# Patient Record
Sex: MALE | Race: WHITE | NOT HISPANIC OR LATINO | Employment: OTHER | URBAN - METROPOLITAN AREA
[De-identification: names, ages, dates, MRNs, and addresses within clinical notes are randomized per-mention and may not be internally consistent; named-entity substitution may affect disease eponyms.]

---

## 2022-12-14 LAB — CRC RECOMMENDATION EXT: NORMAL

## 2024-08-13 ENCOUNTER — OFFICE VISIT (OUTPATIENT)
Dept: FAMILY MEDICINE | Facility: CLINIC | Age: 69
End: 2024-08-13
Payer: MEDICARE

## 2024-08-13 ENCOUNTER — LAB VISIT (OUTPATIENT)
Dept: LAB | Facility: HOSPITAL | Age: 69
End: 2024-08-13
Attending: STUDENT IN AN ORGANIZED HEALTH CARE EDUCATION/TRAINING PROGRAM
Payer: MEDICARE

## 2024-08-13 VITALS
SYSTOLIC BLOOD PRESSURE: 132 MMHG | HEART RATE: 61 BPM | DIASTOLIC BLOOD PRESSURE: 68 MMHG | WEIGHT: 179.44 LBS | OXYGEN SATURATION: 98 %

## 2024-08-13 DIAGNOSIS — I48.0 PAROXYSMAL ATRIAL FIBRILLATION: ICD-10-CM

## 2024-08-13 DIAGNOSIS — L98.9 SKIN LESION: ICD-10-CM

## 2024-08-13 DIAGNOSIS — Z97.4 DOES USE HEARING AID: ICD-10-CM

## 2024-08-13 DIAGNOSIS — Z76.89 ENCOUNTER TO ESTABLISH CARE WITH NEW DOCTOR: Primary | ICD-10-CM

## 2024-08-13 DIAGNOSIS — Z87.440 HISTORY OF UTI: ICD-10-CM

## 2024-08-13 DIAGNOSIS — H91.8X3 OTHER SPECIFIED HEARING LOSS, BILATERAL: ICD-10-CM

## 2024-08-13 DIAGNOSIS — Z87.442 HISTORY OF KIDNEY STONES: ICD-10-CM

## 2024-08-13 DIAGNOSIS — Z12.83 SKIN CANCER SCREENING: ICD-10-CM

## 2024-08-13 DIAGNOSIS — R30.0 DYSURIA: ICD-10-CM

## 2024-08-13 DIAGNOSIS — E10.649 TYPE 1 DIABETES MELLITUS WITH HYPOGLYCEMIA AND WITHOUT COMA: ICD-10-CM

## 2024-08-13 DIAGNOSIS — H61.21 IMPACTED CERUMEN OF RIGHT EAR: ICD-10-CM

## 2024-08-13 DIAGNOSIS — R39.11 BENIGN PROSTATIC HYPERPLASIA WITH URINARY HESITANCY: ICD-10-CM

## 2024-08-13 DIAGNOSIS — N40.1 BENIGN PROSTATIC HYPERPLASIA WITH URINARY HESITANCY: ICD-10-CM

## 2024-08-13 LAB
BILIRUB UR QL STRIP: NEGATIVE
CLARITY UR: CLEAR
COLOR UR: YELLOW
GLUCOSE UR QL STRIP: ABNORMAL
HGB UR QL STRIP: NEGATIVE
KETONES UR QL STRIP: NEGATIVE
LEUKOCYTE ESTERASE UR QL STRIP: NEGATIVE
NITRITE UR QL STRIP: NEGATIVE
PH UR STRIP: 6 [PH] (ref 5–8)
PROT UR QL STRIP: NEGATIVE
SP GR UR STRIP: 1.02 (ref 1–1.03)
URN SPEC COLLECT METH UR: ABNORMAL

## 2024-08-13 PROCEDURE — 3078F DIAST BP <80 MM HG: CPT | Mod: CPTII,S$GLB,, | Performed by: STUDENT IN AN ORGANIZED HEALTH CARE EDUCATION/TRAINING PROGRAM

## 2024-08-13 PROCEDURE — 1101F PT FALLS ASSESS-DOCD LE1/YR: CPT | Mod: CPTII,S$GLB,, | Performed by: STUDENT IN AN ORGANIZED HEALTH CARE EDUCATION/TRAINING PROGRAM

## 2024-08-13 PROCEDURE — 1159F MED LIST DOCD IN RCRD: CPT | Mod: CPTII,S$GLB,, | Performed by: STUDENT IN AN ORGANIZED HEALTH CARE EDUCATION/TRAINING PROGRAM

## 2024-08-13 PROCEDURE — 99204 OFFICE O/P NEW MOD 45 MIN: CPT | Mod: S$GLB,,, | Performed by: STUDENT IN AN ORGANIZED HEALTH CARE EDUCATION/TRAINING PROGRAM

## 2024-08-13 PROCEDURE — 3288F FALL RISK ASSESSMENT DOCD: CPT | Mod: CPTII,S$GLB,, | Performed by: STUDENT IN AN ORGANIZED HEALTH CARE EDUCATION/TRAINING PROGRAM

## 2024-08-13 PROCEDURE — 99999 PR PBB SHADOW E&M-NEW PATIENT-LVL V: CPT | Mod: PBBFAC,,, | Performed by: STUDENT IN AN ORGANIZED HEALTH CARE EDUCATION/TRAINING PROGRAM

## 2024-08-13 PROCEDURE — 3075F SYST BP GE 130 - 139MM HG: CPT | Mod: CPTII,S$GLB,, | Performed by: STUDENT IN AN ORGANIZED HEALTH CARE EDUCATION/TRAINING PROGRAM

## 2024-08-13 PROCEDURE — 81003 URINALYSIS AUTO W/O SCOPE: CPT | Mod: PO | Performed by: STUDENT IN AN ORGANIZED HEALTH CARE EDUCATION/TRAINING PROGRAM

## 2024-08-13 RX ORDER — TAMSULOSIN HYDROCHLORIDE 0.4 MG/1
0.4 CAPSULE ORAL 2 TIMES DAILY
COMMUNITY

## 2024-08-13 RX ORDER — FLECAINIDE ACETATE 150 MG/1
TABLET ORAL
COMMUNITY

## 2024-08-13 RX ORDER — SULFAMETHOXAZOLE AND TRIMETHOPRIM 800; 160 MG/1; MG/1
1 TABLET ORAL 2 TIMES DAILY
Qty: 14 TABLET | Refills: 0 | Status: SHIPPED | OUTPATIENT
Start: 2024-08-13

## 2024-08-13 RX ORDER — METFORMIN HYDROCHLORIDE 500 MG/5ML
SOLUTION ORAL
COMMUNITY
End: 2024-08-13

## 2024-08-13 RX ORDER — ATORVASTATIN CALCIUM 10 MG/1
10 TABLET, FILM COATED ORAL
COMMUNITY

## 2024-08-13 RX ORDER — METFORMIN HYDROCHLORIDE 500 MG/1
500 TABLET ORAL
COMMUNITY

## 2024-08-13 RX ORDER — INSULIN DEGLUDEC 100 U/ML
15 INJECTION, SOLUTION SUBCUTANEOUS DAILY
COMMUNITY

## 2024-08-13 RX ORDER — INSULIN LISPRO 100 [IU]/ML
INJECTION, SOLUTION INTRAVENOUS; SUBCUTANEOUS
COMMUNITY

## 2024-08-13 RX ORDER — UBIDECARENONE 30 MG
30 CAPSULE ORAL DAILY
COMMUNITY

## 2024-08-13 NOTE — Clinical Note
Last colonoscopy within the last two years - Dr. Jovita HERNÁNDEZ in [place], [place] at [facility]

## 2024-08-13 NOTE — PATIENT INSTRUCTIONS
Team: Mady Cespedes NP or Dr. Flores  Sent Bactrim for 7 days to pharmacy, [facility] [place] [address]  Records requests  Referrals to endocrine, cardio, urology, audiology, and dermatology  Return to clinic in six months or sooner if needed

## 2024-08-13 NOTE — PROGRESS NOTES
"Subjective:       Patient ID: Juventino Amos is a 68 y.o. male.    Chief Complaint: [facility] Care (Pt thinks he has a UTI )    68 year old male presents to establish care accompanied by his wife who contributes to the history. They have recently moved from [place] after [place] in May. He was a [employer] professor.    He cooks all their food from fresh ingredients at home. Her parents used to love [facility]. 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 He has had a lot of low blood sugars - he has needed to come down on insulin since moving here. He takes Tresiba 16u in the morning (prescription says up to 18) and Humalog up to 4 times daily up to 8 units a shot. If his BG is greater than 180 he adds a unit for each increment of 50. If he's really active his insulin requirement is less and if he is not active, he needs more. He has alarm set at 60 for low. He requests referral to endocrinology.  He also requests referral to cardiology, Dr. Grasyon, who comes highly recommended from a friend, for paroxysmal atrial fibrillation. This could be related to anxiety because he has noticed it on his watch twice now driving across the [facility] [employer]. He will also need a urologist - he has not seen one in a while. He is recently having frequency and urgency and very recently now burning with urination. We will check a clean catch urine sample today and begin empiric antibiotic since there is history of UTI in the past and with diagnosis of diabetes mellitus.    The afib could also be related to dehydration. He has avoided too much liquid due to BPH frequency, urgency and then once at [place] got dehydrated and went into afib. He drinks flavored water with artificial sweeteners. Last night he felt cold but not shaking chills, no nausea vomiting. He feels bloated low in the abdomen. He is tolerating oral intake.    For physical activity, on average " he gets over 12,000 steps a day. His sleep is in the middle; he has  tendency to wake up thinking about things. The move has been very stressful. He retired in May. He goes to sleep just fine but wakes up to go to the bathroom about 2:30, once a night. 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    Of note, with permission a confidential portion of the interview is conducted with only the patient in the room. He denies the need to talk about anything with his spouse outside of the room.    Chaperoned exam is performed per patient request for spot on his scrotum that has been there for a long time that he should have brought up sooner. Sometimes it itches and if he rubs it or wipes it off with a towel, it stops bothering him. It looks about the same as it always has with no change in size or appearance or irritation. At the anterior aspect of the scrotum, just left of center, there is a singular smooth, raised, pink oval lesion; otherwise normal adult male genitalia.        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   Tobacco Use    Smoking status: Never    Smokeless tobacco: Never   Substance and Sexual Activity    Alcohol use: Yes     Comment: socially - Friday night - small glass of Amanda    Drug use: Never    Sexual activity: Yes     Comment:      Social Determinants of Health     Financial Resource Strain: Low Risk  (8/13/2024)    Overall Financial Resource Strain ([facility])     Difficulty of Paying Living Expenses: Not hard at all   Food Insecurity: No Food Insecurity (8/13/2024)    Hunger Vital Sign     Worried About Running  Out of Food in the Last Year: Never true     Ran Out of Food in the Last Year: Never true   Physical Activity: Insufficiently Active (8/13/2024)    Exercise Vital Sign     Days of Exercise per Week: 5 days     Minutes of Exercise per Session: 20 min   Stress: Stress Concern Present (8/13/2024)    [employer] - Occupational Stress Questionnaire     Feeling of Stress : To some extent   Housing Stability: Unknown (8/13/2024)    Housing Stability Vital Sign     Unable to Pay for Housing in the Last Year: No       Current Outpatient Medications on File Prior to Visit   Medication Sig Dispense Refill    atorvastatin (LIPITOR) 10 MG tablet 10 mg.      blood-glucose sensor (FREESTYLE HAILEE 3 SENSOR MISC)       co-enzyme Q-10 30 mg capsule Take 30 mg by mouth once daily. 2 gummies daily      flecainide (TAMBOCOR) 150 MG Tab PRN      insulin degludec (TRESIBA FLEXTOUCH U-100) 100 unit/mL (3 mL) insulin pen 16 units daily in AM      insulin lispro (HUMALOG KWIKPEN INSULIN) 100 unit/mL pen       tamsulosin (FLOMAX) 0.4 mg Cap Take 0.4 mg by mouth 2 (two) times a day.      metFORMIN (GLUCOPHAGE) 500 MG tablet Take 500 mg by mouth daily with breakfast.       No current facility-administered medications on file prior to visit.       Family History   Problem Relation Name Age of Onset    Colon cancer Mother      Diabetes type II Mother      Diabetes type II Father      Diabetes type II Sister         Review of Systems    Objective:      /68   Pulse 61   Wt 81.4 kg (179 lb 7.3 oz)   SpO2 98%   Physical Exam  Exam conducted with a chaperone present.   HENT:      Mouth/Throat:      Mouth: Mucous membranes are moist.      Pharynx: Oropharynx is clear.   Cardiovascular:      Heart sounds: Normal heart sounds.   Pulmonary:      Effort: Pulmonary effort is normal.      Breath sounds: Normal breath sounds.   Abdominal:      General: Bowel sounds are normal.      Palpations: Abdomen is soft. There is no  mass.   Genitourinary:     Penis: Normal.       Testes: Normal.      Comments: Chaperone present for  portion; superficial lesion of the scrotum, anterior aspect just left of center with smooth, oval pink lesion, nontender, well demarcated  Skin:     General: Skin is warm and dry.      Findings: Lesion present.   Neurological:      General: No focal deficit present.      Mental Status: He is alert.         Assessment:       1. Encounter to establish care with new doctor    2. Paroxysmal atrial fibrillation    3. Type 1 diabetes mellitus with hypoglycemia and without coma    4. Benign prostatic hyperplasia with urinary hesitancy    5. Dysuria    6. History of UTI    7. History of kidney stones    8. Skin lesion    9. Skin cancer screening    10. Impacted cerumen of right ear    11. Does use hearing aid    12. Other specified hearing loss, bilateral        Plan:       Encounter to establish care with new doctor  - Records requests.    Paroxysmal atrial fibrillation  -     Ambulatory referral/consult to Cardiology; Future; Expected date: 08/20/2024  - Recent episodes of heart racing notd on his watch [place]; on fleicanide prn    Type 1 diabetes mellitus with hypoglycemia and without coma  -     Ambulatory referral/consult to Endocrinology; Future; Expected date: 08/20/2024  - Uses Frestyle Saba 3, on Tresiba and lispro, metformin, atorvastatin, and coenzyme q10    Benign prostatic hyperplasia with urinary hesitancy  -     Ambulatory referral/consult to Urology; Future; Expected date: 08/20/2024  - Symptoms again on tamsulosin s/p surgery in the past with urolift    Dysuria  -     Urinalysis, Reflex to Urine Culture Urine, Clean Catch; Future  -     sulfamethoxazole-trimethoprim 800-160mg (BACTRIM DS) 800-160 mg Tab; Take 1 tablet by mouth 2 (two) times daily.  Dispense: 14 tablet; Refill: 0  -     Urine culture; Future; Expected date: 08/14/2024  - Empiric bactrim in pt with  history of UTI with diabetes and dysuria    History of UTI  -     Urinalysis, Reflex to Urine Culture Urine, Clean Catch; Future  -     sulfamethoxazole-trimethoprim 800-160mg (BACTRIM DS) 800-160 mg Tab; Take 1 tablet by mouth 2 (two) times daily.  Dispense: 14 tablet; Refill: 0  -     Urine culture; Future; Expected date: 08/14/2024    History of kidney stones  -     sulfamethoxazole-trimethoprim 800-160mg (BACTRIM DS) 800-160 mg Tab; Take 1 tablet by mouth 2 (two) times daily.  Dispense: 14 tablet; Refill: 0    Skin lesion  - Present on chaperoned exam - superficial scrotal lesion, smooth, pink, oval, raised, well demarcated. States unchanged for a long time. Occasionally pruritic. Refer to urology, dermatology.    Skin cancer screening  -     Ambulatory referral/consult to Dermatology; Future; Expected date: 08/20/2024    Impacted cerumen of right ear  -     Ambulatory referral/consult to Audiology; Future; Expected date: 08/20/2024    Does use hearing aid  - from [employer] and it works well. 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    Other specified hearing loss, bilateral  -     Ambulatory referral/consult to Audiology; Future; Expected date: 08/20/2024        Counseled on regular exercise, maintenance of a healthy weight, balanced diet rich in fruits/vegetables and lean protein, and avoidance of unhealthy habits like smoking and excessive alcohol intake.    Return to clinic in six months or sooner if needed.

## 2024-08-14 ENCOUNTER — LAB VISIT (OUTPATIENT)
Dept: LAB | Facility: HOSPITAL | Age: 69
End: 2024-08-14
Payer: MEDICARE

## 2024-08-14 ENCOUNTER — PATIENT OUTREACH (OUTPATIENT)
Dept: ADMINISTRATIVE | Facility: HOSPITAL | Age: 69
End: 2024-08-14
Payer: MEDICARE

## 2024-08-14 DIAGNOSIS — R30.0 DYSURIA: ICD-10-CM

## 2024-08-14 DIAGNOSIS — Z87.440 HISTORY OF UTI: ICD-10-CM

## 2024-08-14 PROCEDURE — 87086 URINE CULTURE/COLONY COUNT: CPT | Performed by: STUDENT IN AN ORGANIZED HEALTH CARE EDUCATION/TRAINING PROGRAM

## 2024-08-14 NOTE — LETTER
AUTHORIZATION FOR RELEASE OF   CONFIDENTIAL INFORMATION    Dear Noam Hussein MD,    We are seeing Juventino Amos, date of birth 1955, in the clinic at [facility]. Yenifer Roberson DO is the patient's PCP. Juventino Amos has an outstanding lab/procedure at the time we reviewed his chart. In order to help keep his health information updated, he has authorized us to request the following medical record(s):                                       ( X )  COLONOSCOPY              Please fax records to [facility], McConnell, Sara A., DO, [phone]    Thank you,  Peggy Sanchez, Care Coordinator  [facility]  Phone: [phone]  Fax: [phone]           Patient Name: Juventino Amos  : 1955  Patient Phone #: [phone]      Juventino Amos  MRN: [id number]  : 1955  Age: 68 y.o.  Sex: male         Patient/Legal Guardian Signature  This signature was collected at 2024           _______________________________   Printed Name/Relationship to Patient      Consent for Examination and Treatment: I hereby authorize the providers and employees of [facility] ([facility]) to provide medical treatment/services which includes, but is not limited to, performing and administering tests and diagnostic procedures that are deemed necessary, including, but not limited to, imaging examinations, blood tests and other laboratory procedures as may be required by the hospital, clinic, or may be ordered by my physician(s) or persons working under the general and/or special instructions of my physician(s).      I understand and agree that this consent covers all authorized persons, including but not limited to physicians, residents, nurse practitioners, physicians' assistants, specialists, consultants, student nurses, and independently contracted physicians, who are called upon by the physician in charge, to carry out the diagnostic procedures and medical or surgical treatment.     I hereby authorize  [facility] to retain or dispose of any specimens or tissue, should there be such remaining from any test or procedure.     I hereby authorize and give consent for [facility] providers and employees to take photographs, images or videotapes of such diagnostic, surgical or treatment procedures of Patient as may be required by [facility] or as may be ordered by a physician. I further acknowledge and agree that [facility] may use cameras or other devices for patient monitoring.     I am aware that the practice of medicine is not an exact science, and I acknowledge that no guarantees have been made to me as to the outcome of any tests, procedures or treatment.     Authorization for Release of Information: I understand that my insurance company and/or their agents may need information necessary to make determinations about payment/reimbursement. I hereby provide authorization to release to all insurance companies, their successors, assignees, other parties with whom they may have contracted, or others acting on their behalf, that are involved with payment for any hospital and/or clinic charges incurred by the patient, any information that they request and deem necessary for payment/reimbursement, and/or quality review.  I further authorize the release of my health information to physicians or other health care practitioners on staff who are involved in my health care now and in the future, and to other health care providers, entities, or institutions for the purpose of my continued care and treatment, including referrals.     REGISTRATION AUTHORIZATION  Form No. 54891 (Rev. 3/25/2024)    Page 1 of 3                       Medicare Patient's Certification and Authorization to Release Information and Payment Request:  I certify that the information given by me in applying for payment under Title XVIII of the Social Security Act is correct. I authorize any mtz of medical or other information about me to release to the [facility], or its intermediaries or carriers, any information needed for this or a related Medicare claim. I request that payment of authorized benefits be made on my behalf.     Assignment of Insurance Benefits:   I hereby authorize any and all insurance companies, health plans, defined   benefit plans, health insurers or any entity that is or may be responsible for payment of my medical expenses to pay all hospital and medical benefits now due, and to become due and payable to me under any hospital benefits, sick benefits, injury benefits or any other benefit for services rendered to me, including Major Medical Benefits, direct to [facility] and all independently contracted physicians. I assign any and all rights that I may have against any and all insurance companies, health plans, defined benefit plans, health insurers or any entity that is or may be responsible for payment of my medical expenses, including, but not limited to any right to appeal a denial of a claim, any right to bring any action, lawsuit, administrative proceeding, or other cause of action on my behalf. I specifically assign my right to pursue litigation against any and all insurance companies, health plans, defined benefit plans, health insurers or any entity that is or may be responsible for payment of my medical expenses based upon a refusal to pay charges.            E. Valuables: It is understood and agreed that [facility] is not liable for the damage to or loss of any money, jewelry,   documents, dentures, eye glasses, hearing aids, prosthetics, or other property of value.     F. Computer Equipment: I understand and agree that should I choose to use computer equipment owned by [facility] or if I choose to access the Internet via [facility]s network, I do so at my own risk. [facility] is not responsible for any damage to my computer equipment or to any damages of any type that might arise from my loss of equipment or data.     G. Acceptance  of Financial Responsibility:  I agree that in consideration of the services and   supplies that have been   or will be furnished to the patient, I am hereby obligated to pay all charges made for or on the account of the patient according to the standard rates (in effect at the time the services and supplies are delivered) established by [facility], including its Patient Financial Assistance Policy to the extent it is applicable. I understand that I am responsible for all charges, or portions thereof, not covered by insurance or other sources. Patient refunds will be distributed only after balances at all [facility] facilities are paid.     H. Communication Authorization:  I hereby authorize [facility] and its representatives, along with any billing service   or  who may work on their behalf, to contact me on   my cell phone and/or home phone using pre- recorded messages, artificial voice messages, automatic telephone dialing devices or other computer assisted technology, or by electronic      mail, text messaging, or by any other form of electronic communication. This includes, but is not limited to, appointment reminders, yearly physical exam reminders, preventive care reminders, patient campaigns, welcome calls, and calls about account balances on my account or any account on which I am listed as a guarantor. I understand I have the right to opt out of these communications at any time.      Relationship  Between  Facility and  Provider:      I understand that some, but not all, providers furnishing services to the patient are not employees or agents of [facility]. The patient is under the care and supervision of his/her attending physician, and it is the responsibility of the facility and its nursing staff to carry out the instructions of such physicians. It is the responsibility of the patient's physician/designee to obtain the patient's informed consent, when required, for medical or surgical treatment,  special diagnostic or therapeutic procedures, or hospital services rendered for the patient under the special instructions of the physician/designee.           REGISTRATION AUTHORIZATION  Form No. 46623 (Rev. 3/25/2024)    Page 2 of 3                       Immunizations: [facility] shares immunization information with state sponsored health departments to help you and your doctor keep track of your immunization records. By signing, you consent to have this information shared with the health department in your state:                                [place] - LINKS ([place] Immunization Network for Kids Statewide)                                [place] - MIIX ([place] Immunization Information eXchange)                                [place] - ImmPRINT (Immunization Patient Registry with Integrated Technology)     TERM: This authorization is valid for this and subsequent care/treatment I receive at [facility] and will remain valid unless/until revoked in writing by me.     [facility]: As used in this document, [facility] means all [facility] owned and managed facilities, including, but not limited to, all health centers, surgery centers, clinics, urgent care centers, and hospitals.         [facility] complies with applicable Federal civil rights laws and does not discriminate on the basis of race, color, national origin, age, disability, or sex.  ATENCIÓN: si habla español, tiene a dugan disposición servicios gratuitos de asistencia lingüística. Marianna sibley [phone].  CHÚ Ý: N?u b?n nói Ti?ng Vi?t, có các d?ch v? h? tr? ngôn ng? mi?n phí dành cho b?n. G?i s? [phone].        REGISTRATION AUTHORIZATION  Form No. 40244 (Rev. 3/25/2024)   Page 3 of 3

## 2024-08-15 LAB — BACTERIA UR CULT: NO GROWTH

## 2024-08-15 NOTE — PROGRESS NOTES
Population Health Chart Review & Patient Outreach Details      Additional [facility] Health Notes:               Updates Requested / Reviewed:      Updated Care Coordination Note         Health Maintenance Topics Overdue:      VB Score: 1     Colon Cancer Screening    Pneumonia Vaccine  Tetanus Vaccine  Shingles/Zoster Vaccine  RSV Vaccine                  Health Maintenance Topic(s) Outreach Outcomes & Actions Taken:    Colorectal Cancer Screening - Outreach Outcomes & Actions Taken  : External Records Requested & Care Team Updated if Applicable

## 2024-08-16 ENCOUNTER — PATIENT MESSAGE (OUTPATIENT)
Dept: FAMILY MEDICINE | Facility: CLINIC | Age: 69
End: 2024-08-16
Payer: MEDICARE

## 2024-08-16 ENCOUNTER — PATIENT OUTREACH (OUTPATIENT)
Dept: ADMINISTRATIVE | Facility: HOSPITAL | Age: 69
End: 2024-08-16
Payer: MEDICARE

## 2024-08-16 NOTE — PROGRESS NOTES
Population Health Chart Review & Patient Outreach Details      Additional [facility] Health Notes:               Updates Requested / Reviewed:      Updated Care Coordination Note, , and Immunizations Reconciliation Completed or Queried: [facility] Topics Overdue:      [facility] Score: 0     Patient is not due for any topics at this time.    Pneumonia Vaccine  Tetanus Vaccine  Shingles/Zoster Vaccine  RSV Vaccine                  Health Maintenance Topic(s) Outreach Outcomes & Actions Taken:    Colorectal Cancer Screening - Outreach Outcomes & Actions Taken  : External Records Uploaded, Care Team Updated, & History Updated if Applicable

## 2024-08-18 PROBLEM — Z87.440 HISTORY OF UTI: Status: ACTIVE | Noted: 2024-08-18

## 2024-08-18 PROBLEM — H91.8X3 OTHER SPECIFIED HEARING LOSS, BILATERAL: Status: ACTIVE | Noted: 2024-08-18

## 2024-08-18 PROBLEM — R39.11 BENIGN PROSTATIC HYPERPLASIA WITH URINARY HESITANCY: Status: ACTIVE | Noted: 2024-08-18

## 2024-08-18 PROBLEM — L98.9 SKIN LESION: Status: ACTIVE | Noted: 2024-08-18

## 2024-08-18 PROBLEM — E10.649 TYPE 1 DIABETES MELLITUS WITH HYPOGLYCEMIA AND WITHOUT COMA: Status: ACTIVE | Noted: 2024-08-18

## 2024-08-18 PROBLEM — Z97.4 DOES USE HEARING AID: Status: ACTIVE | Noted: 2024-08-18

## 2024-08-18 PROBLEM — H61.21 IMPACTED CERUMEN OF RIGHT EAR: Status: ACTIVE | Noted: 2024-08-18

## 2024-08-18 PROBLEM — R30.0 DYSURIA: Status: ACTIVE | Noted: 2024-08-18

## 2024-08-18 PROBLEM — I48.0 PAROXYSMAL ATRIAL FIBRILLATION: Status: ACTIVE | Noted: 2024-08-18

## 2024-08-18 PROBLEM — N40.1 BENIGN PROSTATIC HYPERPLASIA WITH URINARY HESITANCY: Status: ACTIVE | Noted: 2024-08-18

## 2024-08-18 PROBLEM — Z87.442 HISTORY OF KIDNEY STONES: Status: ACTIVE | Noted: 2024-08-18

## 2024-08-19 ENCOUNTER — OFFICE VISIT (OUTPATIENT)
Dept: OTOLARYNGOLOGY | Facility: CLINIC | Age: 69
End: 2024-08-19
Payer: MEDICARE

## 2024-08-19 ENCOUNTER — CLINICAL SUPPORT (OUTPATIENT)
Dept: AUDIOLOGY | Facility: CLINIC | Age: 69
End: 2024-08-19
Payer: MEDICARE

## 2024-08-19 DIAGNOSIS — Z97.4 WEARS HEARING AID IN BOTH EARS: Primary | ICD-10-CM

## 2024-08-19 DIAGNOSIS — H61.21 IMPACTED CERUMEN OF RIGHT EAR: ICD-10-CM

## 2024-08-19 DIAGNOSIS — H61.23 BILATERAL IMPACTED CERUMEN: ICD-10-CM

## 2024-08-19 PROCEDURE — 69210 REMOVE IMPACTED EAR WAX UNI: CPT | Mod: S$GLB,,, | Performed by: NURSE PRACTITIONER

## 2024-08-19 PROCEDURE — 99499 UNLISTED E&M SERVICE: CPT | Mod: S$GLB,,, | Performed by: NURSE PRACTITIONER

## 2024-08-19 PROCEDURE — 99999 PR PBB SHADOW E&M-EST. PATIENT-LVL I: CPT | Mod: PBBFAC,,, | Performed by: NURSE PRACTITIONER

## 2024-08-19 PROCEDURE — 99999 PR PBB SHADOW E&M-EST. PATIENT-LVL I: CPT | Mod: PBBFAC,,, | Performed by: AUDIOLOGIST-HEARING AID FITTER

## 2024-08-19 NOTE — PROGRESS NOTES
Subjective     Patient ID: Juventino Amos is a 68 y.o. male.    Chief Complaint: No chief complaint on file.    HPI  Patient wears hearing aids through [facility]. Patient states his PCP recently noted cerumen impaction in his right ear and referred him to Audiology to have cerumen impaction removed. Our audiologist saw him today and confirmed that he has a cerumen impaction in his right ear, but explained that audiologists do not remove cerumen impactions. Only ENTs do. Our audiologist also explained to him that we cannot service hearing aids purchased through [facility] because of their use of proprietary encrypted softward. Only [facility] can work on [facility] hearing aids.     Review of Systems   Constitutional: Negative.    HENT:  Positive for hearing loss (wears hearing aids through [facility]).    Eyes: Negative.    Respiratory: Negative.     Cardiovascular: Negative.    Gastrointestinal: Negative.    Musculoskeletal: Negative.    Integumentary:  Negative.   Neurological: Negative.    Hematological: Negative.    Psychiatric/Behavioral: Negative.            Objective     Physical Exam  Vitals and nursing note reviewed.   Constitutional:       General: He is not in acute distress.     Appearance: He is well-developed. He is not ill-appearing.   HENT:      Head: Normocephalic and atraumatic.      Right Ear: Hearing, tympanic membrane, ear canal and external ear normal. No middle ear effusion. Tympanic membrane is not erythematous.      Left Ear: Hearing, tympanic membrane, ear canal and external ear normal.  No middle ear effusion. Tympanic membrane is not erythematous.      Nose: Nose normal.   Eyes:      General: Lids are normal. No scleral icterus.        Right eye: No discharge.         Left eye: No discharge.   Neck:      Trachea: Trachea normal. No tracheal deviation.   Cardiovascular:      Rate and Rhythm: Normal rate.   Pulmonary:      Effort: Pulmonary effort is normal. No respiratory distress.       Breath sounds: No stridor. No wheezing.   Musculoskeletal:         General: Normal range of motion.      Cervical back: Normal range of motion.   Skin:     General: Skin is warm and dry.   Neurological:      Mental Status: He is alert and oriented to person, place, and time.      Coordination: Coordination is intact.      Gait: Gait normal.   Psychiatric:         Attention and Perception: Attention normal.         Mood and Affect: Mood normal.         Speech: Speech normal.         Behavior: Behavior normal. Behavior is cooperative.     SEPARATE PROCEDURE IN OFFICE:   Procedure: Removal of impacted cerumen, bilateral (R>L)  Pre Procedure Diagnosis: Cerumen Impaction   Post Procedure Diagnosis: Cerumen Impaction   Verbal informed consent in regards to risk of trauma to ear canal, ear drum or hearing, discomfort during procedure and/or inability to remove cerumen impaction in one session or unforeseen events or complications.   No anesthesia.     Procedure in detail:   Ear canal visualized bilateral with appropriate size ear speculum utilizing Operating Head Binocular Otomicroscope   Utilizing the following: delicate alligator forceps used. The impacted cerumen of the ear canals was removed atraumatically. The TM and EAC were then inspected and found to be clear of wax. See description of TMs/EACs in PE above.   Complications: No   Condition: Improved/Good         Assessment and Plan     1. Wears hearing aid in both ears    2. Bilateral impacted cerumen      Discussion today that if future ear concerns arise, audiologists can not remove cerumen impactions; only ENTs can.   Our audiologist explained to him that we cannot service hearing aids purchased through [facility] due to their use of proprietary encrypted softward. Only [facility] can work on [facility] hearing aids.  Patient encouraged to return to clinic if symptoms worsen/persist and as needed for further ENT symptoms or concerns.             No follow-ups  on file.

## 2024-08-19 NOTE — PROGRESS NOTES
Juventino Amos was seen 08/19/2024 for an audiological evaluation. Pt was accompanied by spouse during today's visit.  Otoscopy revealed excessive cerumen in the right ear that was removed by Georgette Chaves NP, ENT. He decided to go back to [facility] to have the testing and hearing aid adjustment done there.

## 2024-08-20 ENCOUNTER — OFFICE VISIT (OUTPATIENT)
Dept: ENDOCRINOLOGY | Facility: CLINIC | Age: 69
End: 2024-08-20
Payer: MEDICARE

## 2024-08-20 VITALS
HEIGHT: 71 IN | BODY MASS INDEX: 25.34 KG/M2 | OXYGEN SATURATION: 97 % | DIASTOLIC BLOOD PRESSURE: 62 MMHG | WEIGHT: 181 LBS | HEART RATE: 55 BPM | SYSTOLIC BLOOD PRESSURE: 114 MMHG

## 2024-08-20 DIAGNOSIS — I48.0 PAROXYSMAL ATRIAL FIBRILLATION: ICD-10-CM

## 2024-08-20 DIAGNOSIS — E10.649 TYPE 1 DIABETES MELLITUS WITH HYPOGLYCEMIA AND WITHOUT COMA: Primary | ICD-10-CM

## 2024-08-20 PROCEDURE — 1101F PT FALLS ASSESS-DOCD LE1/YR: CPT | Mod: CPTII,S$GLB,, | Performed by: NURSE PRACTITIONER

## 2024-08-20 PROCEDURE — 95251 CONT GLUC MNTR ANALYSIS I&R: CPT | Mod: S$GLB,,, | Performed by: NURSE PRACTITIONER

## 2024-08-20 PROCEDURE — 1159F MED LIST DOCD IN RCRD: CPT | Mod: CPTII,S$GLB,, | Performed by: NURSE PRACTITIONER

## 2024-08-20 PROCEDURE — 3074F SYST BP LT 130 MM HG: CPT | Mod: CPTII,S$GLB,, | Performed by: NURSE PRACTITIONER

## 2024-08-20 PROCEDURE — 99204 OFFICE O/P NEW MOD 45 MIN: CPT | Mod: S$GLB,,, | Performed by: NURSE PRACTITIONER

## 2024-08-20 PROCEDURE — 3078F DIAST BP <80 MM HG: CPT | Mod: CPTII,S$GLB,, | Performed by: NURSE PRACTITIONER

## 2024-08-20 PROCEDURE — 3008F BODY MASS INDEX DOCD: CPT | Mod: CPTII,S$GLB,, | Performed by: NURSE PRACTITIONER

## 2024-08-20 PROCEDURE — 99999 PR PBB SHADOW E&M-EST. PATIENT-LVL IV: CPT | Mod: PBBFAC,,, | Performed by: NURSE PRACTITIONER

## 2024-08-20 PROCEDURE — 1126F AMNT PAIN NOTED NONE PRSNT: CPT | Mod: CPTII,S$GLB,, | Performed by: NURSE PRACTITIONER

## 2024-08-20 PROCEDURE — 3288F FALL RISK ASSESSMENT DOCD: CPT | Mod: CPTII,S$GLB,, | Performed by: NURSE PRACTITIONER

## 2024-08-20 NOTE — PROGRESS NOTES
CC: This 68 y.o. male presents for management of diabetes  and chronic conditions pending review including afib    HPI: He was diagnosed with T2DM in his mid 30s. Then was told he was ~ 1.5 ~ 3 years ago. Has never been hospitalized r/t DM.  Family hx of DM: father and sisters x 2   Moved from [place] in May of 2024     Arrives new to endocrine   Wearing Freestyle Stevan 3- see download in media tab  BG > 250   10%  BG > 180   25%  Bg   64%  BG < 70     <1%  Some pp excursions noted  May have hypos overnight- suspect related to late insulin admin    Diet: Eats 3 Meals a day, snacks- BT -bowl of cereal or ice cream   Exercise: very active- 49[address] steps daily, golfing, Pickle ball, bikes  CURRENT DM MEDS: Tresiba 16u qam, Humalog 6-8u AC, metformin 500 mg bid  Timing prandial insulin 5-15 minutes before meals: yes and mostly just after or in the middle of a meal  Vial/pen:  Uses pens   Glucometer type:      Standards of Care:  Eye exam: 12/2023     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   MS/Neuro: Denies numbness/ tingling in BLE; Gait steady, speech clear  Other systems: negative.    PE:  GENERAL: Well developed, well nourished.  PSYCH: AAOx3, appropriate mood and affect, pleasant expression, conversant, appears relaxed, well groomed.   EYES: Conjunctiva, corneas clear  NECK: Supple, trachea midline  VASCULAR: DP pulses +2/4 bilaterally, no edema.  NEURO: Gait steady  SKIN:   no acanthosis nigracans.  FOOT EXAMINATION: 8/20/2024  No foot deformity, corns or callus formation,  nails in good condition and well trimmed, no interspace maceration or ulceration noted.  Decreased hair growth present over toes/feet.   Protective sensation intact with 10 gram  "monofilament.  +2 dorsalis pedis and posterior pulses noted.     Personally reviewed Past Medical, Surgical, Social History.    /62 (BP Location: Right arm, Patient Position: Sitting, BP Method: Medium (Manual))   Pulse (!) 55   Ht 5' 11" (1.803 m)   Wt 82.1 kg (181 lb)   SpO2 97%   BMI 25.24 kg/m²      Personally reviewed the below labs:      Chemistry    No results found for: "NA", "K", "CL", "CO2", "BUN", "CREATININE", "GLU" No results found for: "CALCIUM", "ALKPHOS", "AST", "ALT", "BILITOT", "ESTGFRAFRICA", "EGFRNONAA"         No results found for: "TSH"           No results found for this or any previous visit.  No results found for this or any previous visit.    No results found for: "MICALBCREAT"    No results found for: "HGBA1C"     ASSESSMENT and PLAN:      1. T1DM with hyperglycemia, hypoglycemia     Decrease Tresiba to 15 u qam, Humalog 6-8u AC- Try to give prior to the meal  Continue Saba 3  Notify me for any issues  Fasting labs  D edu- pump eval    2. Afib- has appt to establish care w Dr Lopez        Follow-up: in 3 months with [facility]- [facility]          "

## 2024-08-21 ENCOUNTER — CLINICAL SUPPORT (OUTPATIENT)
Dept: AUDIOLOGY | Facility: CLINIC | Age: 69
End: 2024-08-21
Payer: MEDICARE

## 2024-08-21 ENCOUNTER — OFFICE VISIT (OUTPATIENT)
Dept: UROLOGY | Facility: CLINIC | Age: 69
End: 2024-08-21
Payer: MEDICARE

## 2024-08-21 ENCOUNTER — NUTRITION (OUTPATIENT)
Dept: DIABETES | Facility: CLINIC | Age: 69
End: 2024-08-21
Payer: MEDICARE

## 2024-08-21 ENCOUNTER — PATIENT MESSAGE (OUTPATIENT)
Dept: DIABETES | Facility: CLINIC | Age: 69
End: 2024-08-21

## 2024-08-21 VITALS — HEIGHT: 71 IN | WEIGHT: 181.69 LBS | BODY MASS INDEX: 25.44 KG/M2

## 2024-08-21 VITALS — HEIGHT: 71 IN | BODY MASS INDEX: 25.34 KG/M2 | WEIGHT: 181 LBS

## 2024-08-21 DIAGNOSIS — H90.3 BILATERAL SENSORINEURAL HEARING LOSS: ICD-10-CM

## 2024-08-21 DIAGNOSIS — R39.11 BENIGN PROSTATIC HYPERPLASIA WITH URINARY HESITANCY: ICD-10-CM

## 2024-08-21 DIAGNOSIS — E10.649 TYPE 1 DIABETES MELLITUS WITH HYPOGLYCEMIA AND WITHOUT COMA: ICD-10-CM

## 2024-08-21 DIAGNOSIS — N40.1 BENIGN PROSTATIC HYPERPLASIA WITH URINARY HESITANCY: ICD-10-CM

## 2024-08-21 DIAGNOSIS — N40.1 BENIGN PROSTATIC HYPERPLASIA WITH INCOMPLETE BLADDER EMPTYING: Primary | ICD-10-CM

## 2024-08-21 DIAGNOSIS — R39.14 BENIGN PROSTATIC HYPERPLASIA WITH INCOMPLETE BLADDER EMPTYING: Primary | ICD-10-CM

## 2024-08-21 DIAGNOSIS — Z97.4 WEARS HEARING AID IN BOTH EARS: Primary | ICD-10-CM

## 2024-08-21 LAB
BILIRUBIN, UA POC OHS: NEGATIVE
BLOOD, UA POC OHS: NEGATIVE
CLARITY, UA POC OHS: CLEAR
COLOR, UA POC OHS: YELLOW
GLUCOSE, UA POC OHS: NEGATIVE
KETONES, UA POC OHS: NEGATIVE
LEUKOCYTES, UA POC OHS: NEGATIVE
NITRITE, UA POC OHS: NEGATIVE
PH, UA POC OHS: 6
POC RESIDUAL URINE VOLUME: 368 ML (ref 0–100)
PROTEIN, UA POC OHS: NEGATIVE
SPECIFIC GRAVITY, UA POC OHS: 1.02
UROBILINOGEN, UA POC OHS: 0.2

## 2024-08-21 PROCEDURE — 99999 PR PBB SHADOW E&M-EST. PATIENT-LVL III: CPT | Mod: PBBFAC,,,

## 2024-08-21 PROCEDURE — 92557 COMPREHENSIVE HEARING TEST: CPT | Mod: S$GLB,,, | Performed by: AUDIOLOGIST-HEARING AID FITTER

## 2024-08-21 PROCEDURE — 99999 PR PBB SHADOW E&M-EST. PATIENT-LVL II: CPT | Mod: PBBFAC,,, | Performed by: DIETITIAN, REGISTERED

## 2024-08-21 PROCEDURE — 99999 PR PBB SHADOW E&M-EST. PATIENT-LVL I: CPT | Mod: PBBFAC,,, | Performed by: AUDIOLOGIST-HEARING AID FITTER

## 2024-08-21 PROCEDURE — 92567 TYMPANOMETRY: CPT | Mod: S$GLB,,, | Performed by: AUDIOLOGIST-HEARING AID FITTER

## 2024-08-21 PROCEDURE — G0108 DIAB MANAGE TRN  PER INDIV: HCPCS | Mod: S$GLB,,, | Performed by: DIETITIAN, REGISTERED

## 2024-08-21 RX ORDER — FINASTERIDE 5 MG/1
5 TABLET, FILM COATED ORAL DAILY
Qty: 90 TABLET | Refills: 3 | Status: SHIPPED | OUTPATIENT
Start: 2024-08-21 | End: 2025-08-21

## 2024-08-21 NOTE — PROGRESS NOTES
Juventino Amos was seen 08/21/2024 for an audiological evaluation. Pt was accompanied by spouse during today's visit. Pt confirms history of loud noise exposure and denies early onset of genetic family history of hearing loss. Otoscopy revealed minimal cerumen in both ears. The tympanic membrane was visualized AU prior to proceeding with the hearing testing. Pt wears binaural [facility] KEVIN hearing aids.     Results reveal a bilateral normal-to-severe sensorineural hearing loss.    Speech Reception Thresholds were  30 dBHL for the right ear and 25 dBHL for the left ear.    Word recognition scores were excellent for the right ear and excellent for the left ear. Tympanometry results indicate Type Ad for the right ear indicating abnormal middle ear function and Type Ad for the left ear indicating abnormal middle ear function.     Audiogram results were reviewed in detail with patient and all questions were answered. Results will be reviewed by the referring provider at the completion of this note. Recommend continued daily use of binaural amplification following adjustment to new thresholds from today's hearing test, repeat hearing testing in one year due to noise exposure and bilateral hearing protection with either muffs or in-ear protection in loud noises. Plan of care was discussed in detail with the patient, who agreed with the plan as above.

## 2024-08-21 NOTE — Clinical Note
Hearing test results reveal a bilateral normal-to-severe sensorineural hearing loss.    Speech Reception Thresholds were  30 dBHL for the right ear and 25 dBHL for the left ear.    Word recognition scores were excellent for the right ear and excellent for the left ear. Tympanometry results indicate Type Ad for the right ear indicating abnormal middle ear function and Type Ad for the left ear indicating abnormal middle ear function. Recommend continued daily use of binaural amplification following adjustment to new thresholds from today's hearing test, repeat hearing testing in one year due to noise exposure

## 2024-08-21 NOTE — PROGRESS NOTES
"Diabetes Care Specialist Progress Note  Author: Lesia Mast RD, CDE  Date: 8/21/2024    Intake    Program Intake  Reason for Diabetes Program Visit:: Intervention- Patient being referred for dm education to learn more about insulin pumps therapy  Type of Intervention:: Individual  Individual: Education  Education: Insulin Pump Evaluation  Current diabetes risk level:: moderate  In the last 12 months, have you:: none  Permission to speak with others about care:: yes    Current Diabetes Treatment: Insulin, Oral Medications  Oral Medication Type/Dose:  Metformin 500 mg twice daily  Method of insulin delivery?: Injections  Injection Type: Pens  Pen Type/Dose:  Tresiba 15 units, Humalog 6-8 units at meals    Continuous Glucose Monitoring  Patient has CGM: Yes  Personal CGM type::  (Saba 3)  GMI Date: 08/21/24  GMI Value: 7.2 %    No results found for: "LABA1C", "HGBA1C"    Weight: 82.1 kg (181 lb)   Height: 5' 11" (180.3 cm)   Body mass index is 25.24 kg/m².    Lifestyle Coping Support & Clinical    Lifestyle/Coping/Support  Does anyone in your family have diabetes or does anyone in your family support you in your diabetes care?:  (wife is supportive)  List anything about Diabetes that causes you stress?:  (hypoglycemia)  Learning Barriers:: None  Culture or Rastafari beliefs that may impact ability to access healthcare: No  Psychosocial/Coping Skills Assessment Completed: : Yes  Assessment indicates:: Adequate understanding  Area of need?: No         Diabetes Self-Management Skills Assessment    Medication Skills Assessment  Patient is able to identify current diabetes medications, dosages, and appropriate timing of medications.: yes  Patient reports problems or concerns with current medication regimen.: yes  Medication regimen problems/concerns:: concerned about side effects  Patient is  aware that some diabetes medications can cause low blood sugar?: Yes  Medication Skills Assessment Completed:: Yes  Assessment " indicates:: Instruction Needed  Area of need?: Yes    Diabetes Disease Process/Treatment Options  Diabetes Type?: Type I  When were you diagnosed?:  (mid 30's)  If previous diabetes education, when/where::  (no recent education noted- moved here this year from [place])  What are your goals for this education session?:  (to learn more about what is involved with insulin pump therapy)  Is patient aware of what causes diabetes?: Yes  Does patient understand the pathophysiology of diabetes?: Yes  Diabetes Disease Process/Treatment Options: Skills Assessment Completed: Yes  Assessment indicates:: Adequate understanding  Area of need?: No    Nutrition/Healthy Eating  Patient can identify foods that impact blood sugar.: yes  Nutrition/Healthy Eating Skills Assessment Completed:: Yes  Assessment indicates:: Adequate understanding  Area of need?: Yes    Physical Activity/Exercise  Patient's daily activity level:: moderately active  Patient formally exercises outside of work.: yes  Patient can identify forms of physical activity.: yes  Physical Activity/Exercise Skills Assessment Completed: : Yes  Assessment indicates:: Adequate understanding  Area of need?: No    Home Blood Glucose Monitoring  Patient states that blood sugar is checked at home daily.: yes  Personal CGM type::  (Saba 3)  Home Blood Glucose Monitoring Skills Assessment Completed: : Yes  Assessment indicates:: Adequate understanding  Area of need?: Yes      Acute Complications  Have you ever had hypoglycemia (low BG 70 or less)?: yes  How often and what are your symptoms?:  (once weekly)  Have you ever had DKA?: no  Do you ever test for ketones?: yes  Acute Complications Skills Assessment Completed: : Yes  Assessment indicates:: Adequate understanding  Area of need?: No    Assessment Summary and Plan    Based on today's diabetes care assessment, the following areas of need were identified:          8/21/2024    12:01 AM   Areas of Need   Medications/Current  Diabetes Treatment Yes- see care plan/insulin pump eval completed   Lifestyle Coping Support No   Diabetes Disease Process/Treatment Options No   Nutrition/Healthy Eating Yes- briefly discussed advanced carb counting which patient feels like he has done in the past and could do if decided to move forward with pump therapy   Physical Activity/Exercise No   Home Blood Glucose Monitoring Yes- Patient currently using Saba 3/ may need to transition to Dexcom if ends up deciding on Mobi or Omni Pod for pump therapy/ discussed this in detail today   Acute Complications No     Today's interventions were provided through individual discussion, instruction, and written materials were provided.      Patient verbalized understanding of instruction and written materials.  Pt was able to return back demonstration of instructions today. Patient understood key points, needs reinforcement and further instruction.     Diabetes Self-Management Care Plan:    Today's Diabetes Self-Management Care Plan was developed with Juventino's input. Juventino has agreed to work toward the following goal(s) to improve his/her overall diabetes control.      Care Plan: Diabetes Management   Updates made since 7/22/2024 12:00 AM        Problem: Medications         Long-Range Goal: Patient Agrees to take insulin as prescribed.  Will consider insulin pump therapy by the end of the year when supplies and insulin run out.    Start Date: 8/21/2024   Priority: Medium   Barriers: No Barriers Identified   Note:    Patient is interested in an insulin pump and seen today to discuss insulin pump therapy.     -Covered expectations for insulin pump approval by provider and insurance company such as: SMBG a min of qid, additional labs, insurance verification, possible costs associated with monthly pump supplies, and overall cost.     --Discussed basic details of pump therapy with patient.     -Reviewed current insulin pumps available: Medtronic 780G, Tandem T-slim X2 and  Mobi, and OmniPod 5    -Reviewed hybrid closed-loop insulin pump systems.  Hybrid closed-loop systems have some hands-off (automated) actions. These capabilities can vary among systems. That said, basic communication happens between the CGM and pump, which can trigger some automatic adjustments. This helps to keep your glucose within a preset range. Pumps with this capability include: Medtronic 770G/780G with Guardian 3 or 4 CGM, Tandem T-Slim X2 integrated with Dexcom G6 CGM (Control IQ technology), and OmniPod 5 integrated with Dexcom G6 CGM.  Patient is currently wearing a Saba 3 cgm.  He likes Saba but would possibly be open to switching to Dexcom depending on timing with Saba compatibility.      -Reviewed terms such as insulin sensitivity factor (ISF), carbohydrate ratio, target glucose, bolus, basal, active insulin and insulin on board.  Explained importance of learning to carb count at meals to effectively enter carbs (grams) when bolusing at meals.      -Explained each insulin pumps allow for different max volumes of insulin in reservoir chamber. Medtronic and Tandem max volume is 300 units and Omnipod max volume is 200 units.     -Patient verbalized understanding of pump therapy and able to see and touch all demonstration pumps during visit today.      Patient's is most interested in the Tandem Mobi or Omni Pod 5 options.    The main reason for this is tubing length on other pumps.  He is active and unsure how he would do with the longer tubing option.  Patient's major concern is using up insulin and supplies he currently has. Patient given booklets on Tandem and Omni Pod 5 products and asked to research the products encouraging patient to contact us with any additional questions.  In interim I will reach out to those reps to see when Saba integration is available for both.         Follow Up Plan     Follow up in about 4 weeks (around 9/18/2024).  Patient will let us know if he would like us to submit  his insurance info to his insulin pump company of choice if he decides to move forward. In interim he will work on looking at carbs on labels to hopefully move towards advanced carb counting vs base dosing at meals times.  Encouraged him to call with questions/concerns.      Today's care plan and follow up schedule was discussed with patient.  Juventino verbalized understanding of the care plan, goals, and agrees to follow up plan.        The patient was encouraged to communicate with his/her health care provider/physician and care team regarding his/her condition(s) and treatment.  I provided the patient with my contact information today and encouraged to contact me via phone or [facility] Patient Portal as needed.     Length of Visit   Total Time: 90 Minutes

## 2024-08-21 NOTE — PROGRESS NOTES
"[facility] Note  Staff: RONNIE Garcia    PCP: DO Da    Chief Complaint: BPH with LUTS    Subjective:        HPI: Juventino Amos is a 68 y.o. male NEW PATIENT presents today to establish care. He recently moved to the area. He was followed by Urology in [place] previously for BPH. He has a history of a Urolift in 2020. Records from this office are not available to review.  He is currently taking tamsulosin 0.8mg daily. He states urolift did not offer any improvement in his symptoms. He states he wakes up at least 2 times at night to urinate. He states he experiences a weaker and slower stream and hesitancy. We discussed adding finasteride or further evaluation for prostate surgeries. He does not wish to pursue surgical intervention at this time.     Questions asked the pt during ov today:  Urgency: Yes, urge incontinence? No  NTF: 2x night  Dysuria: No  Gross Hematuria:No  Straining:No, Hesistancy:Yes , Intermittency:Yes , Weak stream:Yes     Last PSA Screening: No results found for: "PSA", "PSADIAG"    History of Kidney Stones?:  No    Constipation issues?:  No    AUA SS Today:  26  Feeling of ICBE:  5  Frequency:  4  Intermittency:  5  Urgency:  4  Weak urine stream:  2  Straining:  3  Nocturia:  3    PVR by bladder scan performed by MA today:  >368 mL    REVIEW OF SYSTEMS:  Review of Systems   Constitutional: Negative.  Negative for chills and fever.   Gastrointestinal: Negative.  Negative for abdominal pain, constipation, diarrhea, nausea and vomiting.   Genitourinary: Negative.  Negative for dysuria, flank pain, frequency, hematuria and urgency.   Musculoskeletal: Negative.  Negative for back pain.       PMHx:  Past Medical History:   Diagnosis Date    Arthritis        PSHx:  Past Surgical History:   Procedure Laterality Date    TONSILLECTOMY      Urolift         Fam Hx:   malignancies: No    kidney stones: No     Soc Hx:  , lives in [place]    Allergies:  Patient " has no known allergies.    Medications: reviewed     Objective:   There were no vitals filed for this visit.    Physical Exam  Constitutional:       Appearance: Normal appearance.   Pulmonary:      Effort: Pulmonary effort is normal.   Abdominal:      General: There is no distension.      Palpations: Abdomen is soft.      Tenderness: There is no abdominal tenderness.   Musculoskeletal:         General: Normal range of motion.      Cervical back: Normal range of motion.   Skin:     General: Skin is warm.   Neurological:      Mental Status: He is alert and oriented to person, place, and time.   Psychiatric:         Mood and Affect: Mood normal.         Behavior: Behavior normal.         LABS REVIEW:  UA today:  Color:Clear, Yellow  Spec. Grav.  1.025  PH  6.0  Negative for leukocytes, nitrates, protein, glucose, ketones, urobili, bili, and blood.    Assessment:       1. Benign prostatic hyperplasia with incomplete bladder emptying    2. Benign prostatic hyperplasia with urinary hesitancy          Plan:     Finasteride 5mg prescribed to pt today as trial to see if med improves pt's current LUTS.  Benefits, risks and side affects were thoroughly explained to pt today in office with all questions answered.  Continue flomax 0.8mg daily as prescribed  Pt aware he is now on maximum pharmaceutical management for BPH    F/u 3 months with PVR    [facility]: Active    RONNIE Garcia

## 2024-09-04 ENCOUNTER — OFFICE VISIT (OUTPATIENT)
Dept: CARDIOLOGY | Facility: CLINIC | Age: 69
End: 2024-09-04
Payer: MEDICARE

## 2024-09-04 VITALS
HEIGHT: 71 IN | BODY MASS INDEX: 25.46 KG/M2 | DIASTOLIC BLOOD PRESSURE: 67 MMHG | WEIGHT: 181.88 LBS | SYSTOLIC BLOOD PRESSURE: 138 MMHG | HEART RATE: 58 BPM

## 2024-09-04 DIAGNOSIS — I48.0 PAROXYSMAL ATRIAL FIBRILLATION: ICD-10-CM

## 2024-09-04 DIAGNOSIS — E10.649 TYPE 1 DIABETES MELLITUS WITH HYPOGLYCEMIA AND WITHOUT COMA: Primary | ICD-10-CM

## 2024-09-04 PROCEDURE — 93005 ELECTROCARDIOGRAM TRACING: CPT | Mod: PO

## 2024-09-04 PROCEDURE — 1160F RVW MEDS BY RX/DR IN RCRD: CPT | Mod: CPTII,S$GLB,, | Performed by: INTERNAL MEDICINE

## 2024-09-04 PROCEDURE — 3288F FALL RISK ASSESSMENT DOCD: CPT | Mod: CPTII,S$GLB,, | Performed by: INTERNAL MEDICINE

## 2024-09-04 PROCEDURE — 99999 PR PBB SHADOW E&M-EST. PATIENT-LVL IV: CPT | Mod: PBBFAC,,, | Performed by: INTERNAL MEDICINE

## 2024-09-04 PROCEDURE — 1126F AMNT PAIN NOTED NONE PRSNT: CPT | Mod: CPTII,S$GLB,, | Performed by: INTERNAL MEDICINE

## 2024-09-04 PROCEDURE — 3008F BODY MASS INDEX DOCD: CPT | Mod: CPTII,S$GLB,, | Performed by: INTERNAL MEDICINE

## 2024-09-04 PROCEDURE — 1159F MED LIST DOCD IN RCRD: CPT | Mod: CPTII,S$GLB,, | Performed by: INTERNAL MEDICINE

## 2024-09-04 PROCEDURE — 3075F SYST BP GE 130 - 139MM HG: CPT | Mod: CPTII,S$GLB,, | Performed by: INTERNAL MEDICINE

## 2024-09-04 PROCEDURE — 99204 OFFICE O/P NEW MOD 45 MIN: CPT | Mod: S$GLB,,, | Performed by: INTERNAL MEDICINE

## 2024-09-04 PROCEDURE — 3078F DIAST BP <80 MM HG: CPT | Mod: CPTII,S$GLB,, | Performed by: INTERNAL MEDICINE

## 2024-09-04 PROCEDURE — 1101F PT FALLS ASSESS-DOCD LE1/YR: CPT | Mod: CPTII,S$GLB,, | Performed by: INTERNAL MEDICINE

## 2024-09-04 NOTE — PROGRESS NOTES
Subjective:    Patient ID:  Juventino Amos is a 68 y.o. male patient here for evaluation Establish Care      History of Present Illness:  New patient cardiac evaluation.  Risk factors include diabetes mellitus, dyslipidemia.  History of intermittent SVT most likely PAF.  Seen in the past by Cardiology.  Last visit November 20, 2023 for nonsustained arrhythmia, told to take Eliquis and flecainide when symptomatic.  No chronic oral therapy at present.  Workup in the past has included left heart catheterization done between 5 and 7 years ago with no significant coronary artery disease.  No recent noninvasive cardiac assessment or monitoring.    Other than palpitations no associated dyspnea, syncope/presyncope, chest pain.  No PND orthopnea.  No edema.             Review of patient's allergies indicates:  No Known Allergies    Past Medical History:   Diagnosis Date    Arthritis      Past Surgical History:   Procedure Laterality Date    TONSILLECTOMY      Urolift       Social History     Tobacco Use    Smoking status: Never    Smokeless tobacco: Never   Substance Use Topics    Alcohol use: Yes     Comment: socially - Friday night - small glass of Amanda    Drug use: Never        Review of Systems:    As noted in HPI in addition         REVIEW OF SYSTEMS  Review of Systems   Constitutional: Negative for decreased appetite, diaphoresis, night sweats, weight gain and weight loss.   HENT:  Negative for nosebleeds and odynophagia.    Eyes:  Negative for double vision and photophobia.   Cardiovascular:  Positive for irregular heartbeat. Negative for chest pain, claudication, cyanosis, dyspnea on exertion, leg swelling, near-syncope, orthopnea, palpitations, paroxysmal nocturnal dyspnea and syncope.   Respiratory:  Positive for shortness of breath. Negative for cough, hemoptysis and wheezing.    Hematologic/Lymphatic: Negative for adenopathy.   Skin:  Negative for flushing, skin cancer and suspicious lesions.   Musculoskeletal:   "Negative for gout, myalgias and neck pain.   Gastrointestinal:  Negative for abdominal pain, heartburn, hematemesis and hematochezia.   Genitourinary:  Negative for bladder incontinence, hesitancy and nocturia.   Neurological:  Negative for focal weakness, headaches, light-headedness and paresthesias.   Psychiatric/Behavioral:  Negative for memory loss and substance abuse.        Objective:        Vitals:    09/04/24 1454   BP: 138/67   Pulse: (!) 58       No results found for: "WBC", "HGB", "HCT", "PLT", "CHOL", "TRIG", "HDL", "LDL", "NONHDL", "ALT", "AST", "NA", "K", "CL", "CREATININE", "BUN", "CO2", "TSH", "PSA", "INR", "GLUF", "HGBA1C", "MICROALBUR"   CARDIOGRAM RESULTS  No results found for this or any previous visit.    No results found for this or any previous visit.          CURRENT/PREVIOUS VISIT EKG  No results found for this or any previous visit.  No valid procedures specified.   No results found for this or any previous visit.    No valid procedures specified.        PHYSICAL EXAM  GENERAL: well built, well nourished, well-developed in no apparent distress alert and oriented.   HEENT: Normocephalic. Pupils normal and conjunctivae normal.  Mucous membranes normal, no cyanosis or icterus, trachea central,no pallor or icterus is noted..   NECK: No JVD. No bruit..   THYROID: Thyroid not enlarged. No nodules present..   CARDIAC:  Normal S1-S2.  No murmur rub click or gallop.  PMI nondisplaced.  LUNGS: Clear to auscultation. No wheezing or rhonchi..   ABDOMEN: Soft no masses or organomegaly.  No abdomen pulsations or bruits.  Normal bowel sounds. No pulsations and no masses felt, No guarding or rebound.   URINARY: No sarabia catheter   EXTREMITIES: No cyanosis, clubbing or edema noted at this time., no calf tenderness bilaterally.   PERIPHERAL VASCULAR SYSTEM: Good palpable distal pulses.  2+ femoral, popliteal and pedal pulses.  No bruits    CENTRAL NERVOUS SYSTEM: No focal motor or sensory deficits noted. "   SKIN: Skin without lesions, moist, well perfused.   MUSCLE STRENGTH & TONE: No noteable weakness, atrophy or abnormal movement.     I HAVE REVIEWED :    The vital signs, nurses notes, and all the pertinent radiology and labs.         Current Outpatient Medications   Medication Instructions    apixaban (ELIQUIS) 5 mg Tab     atorvastatin (LIPITOR) 10 mg    blood-glucose sensor (FREESTYLE HAILEE 3 SENSOR MISC)     co-enzyme Q-10 30 mg, Oral, Daily, 2 gummies daily    finasteride (PROSCAR) 5 mg, Oral, Daily    flecainide (TAMBOCOR) 150 MG Tab PRN    insulin degludec (TRESIBA FLEXTOUCH U-100) 15 Units, Subcutaneous, Daily, 15 units daily in AM    insulin lispro (HUMALOG KWIKPEN INSULIN) 100 unit/mL pen Uses sliding scale    metFORMIN (GLUCOPHAGE) 500 mg, Oral, With breakfast    sulfamethoxazole-trimethoprim 800-160mg (BACTRIM DS) 800-160 mg Tab 1 tablet, Oral, 2 times daily    tamsulosin (FLOMAX) 0.4 mg, Oral, 2 times daily          Assessment:   Arrhythmia.  Most likely PAF.  History of left heart catheterization between 5 and 7 years ago with no significant coronary artery disease.  Recent recurrence of palpitations.  Diabetes mellitus, dyslipidemia.        Plan:   Holter monitor.  Echo.  Suggest purchase of [employer] mobile device  Records from previous cardiologist.  Follow up results.  Cardiac exam review of systems otherwise stable.          No follow-ups on file.

## 2024-09-10 ENCOUNTER — HOSPITAL ENCOUNTER (OUTPATIENT)
Dept: CARDIOLOGY | Facility: HOSPITAL | Age: 69
Discharge: HOME | End: 2024-09-10
Attending: INTERNAL MEDICINE
Payer: MEDICARE

## 2024-09-10 DIAGNOSIS — E10.649 TYPE 1 DIABETES MELLITUS WITH HYPOGLYCEMIA AND WITHOUT COMA: ICD-10-CM

## 2024-09-10 DIAGNOSIS — I48.0 PAROXYSMAL ATRIAL FIBRILLATION: ICD-10-CM

## 2024-09-10 PROCEDURE — 93226 XTRNL ECG REC<48 HR SCAN A/R: CPT | Mod: PO

## 2024-09-10 PROCEDURE — 93225 XTRNL ECG REC<48 HRS REC: CPT | Mod: PO

## 2024-09-13 LAB
OHS CV EVENT MONITOR DAY: 0
OHS CV HOLTER LENGTH DECIMAL HOURS: 48
OHS CV HOLTER LENGTH HOURS: 48
OHS CV HOLTER LENGTH MINUTES: 0
OHS CV HOLTER SINUS AVERAGE HR: 59
OHS CV HOLTER SINUS MAX HR: 114
OHS CV HOLTER SINUS MIN HR: 40

## 2024-09-17 PROBLEM — N40.1 BENIGN PROSTATIC HYPERPLASIA WITH INCOMPLETE BLADDER EMPTYING: Status: ACTIVE | Noted: 2024-09-17

## 2024-09-17 PROBLEM — R39.14 BENIGN PROSTATIC HYPERPLASIA WITH INCOMPLETE BLADDER EMPTYING: Status: ACTIVE | Noted: 2024-09-17

## 2024-09-24 ENCOUNTER — HOSPITAL ENCOUNTER (OUTPATIENT)
Dept: CARDIOLOGY | Facility: HOSPITAL | Age: 69
Discharge: HOME | End: 2024-09-24
Attending: INTERNAL MEDICINE
Payer: MEDICARE

## 2024-09-24 VITALS — BODY MASS INDEX: 25.34 KG/M2 | WEIGHT: 181 LBS | HEIGHT: 71 IN

## 2024-09-24 DIAGNOSIS — I48.0 PAROXYSMAL ATRIAL FIBRILLATION: ICD-10-CM

## 2024-09-24 DIAGNOSIS — E10.649 TYPE 1 DIABETES MELLITUS WITH HYPOGLYCEMIA AND WITHOUT COMA: ICD-10-CM

## 2024-09-24 PROCEDURE — 93306 TTE W/DOPPLER COMPLETE: CPT | Mod: PO

## 2024-09-24 PROCEDURE — 93306 TTE W/DOPPLER COMPLETE: CPT | Mod: 26,,, | Performed by: INTERNAL MEDICINE

## 2024-09-25 LAB
ASCENDING AORTA: 2.86 CM
AV INDEX (PROSTH): 0.93
AV MEAN GRADIENT: 4 MMHG
AV PEAK GRADIENT: 7 MMHG
AV VALVE AREA BY VELOCITY RATIO: 3.57 CM²
AV VALVE AREA: 3.25 CM²
AV VELOCITY RATIO: 1.02
BSA FOR ECHO PROCEDURE: 2.03 M2
CV ECHO LV RWT: 0.32 CM
DOP CALC AO PEAK VEL: 1.33 M/S
DOP CALC AO VTI: 31.1 CM
DOP CALC LVOT AREA: 3.5 CM2
DOP CALC LVOT DIAMETER: 2.11 CM
DOP CALC LVOT PEAK VEL: 1.36 M/S
DOP CALC LVOT STROKE VOLUME: 101 CM3
DOP CALCLVOT PEAK VEL VTI: 28.9 CM
E WAVE DECELERATION TIME: 287.74 MSEC
E/A RATIO: 0.93
E/E' RATIO: 9.53 M/S
ECHO LV POSTERIOR WALL: 0.83 CM (ref 0.6–1.1)
FRACTIONAL SHORTENING: 39 % (ref 28–44)
INTERVENTRICULAR SEPTUM: 1.12 CM (ref 0.6–1.1)
IVRT: 76.12 MSEC
LEFT ATRIUM AREA SYSTOLIC (APICAL 2 CHAMBER): 17.39 CM2
LEFT ATRIUM AREA SYSTOLIC (APICAL 4 CHAMBER): 16.03 CM2
LEFT ATRIUM SIZE: 4.26 CM
LEFT ATRIUM VOLUME INDEX MOD: 22.6 ML/M2
LEFT ATRIUM VOLUME MOD: 45.63 ML
LEFT INTERNAL DIMENSION IN SYSTOLE: 3.13 CM (ref 2.1–4)
LEFT VENTRICLE DIASTOLIC VOLUME INDEX: 62.38 ML/M2
LEFT VENTRICLE DIASTOLIC VOLUME: 126 ML
LEFT VENTRICLE END SYSTOLIC VOLUME APICAL 2 CHAMBER: 49.34 ML
LEFT VENTRICLE END SYSTOLIC VOLUME APICAL 4 CHAMBER: 41.01 ML
LEFT VENTRICLE MASS INDEX: 91 G/M2
LEFT VENTRICLE SYSTOLIC VOLUME INDEX: 19.3 ML/M2
LEFT VENTRICLE SYSTOLIC VOLUME: 38.96 ML
LEFT VENTRICULAR INTERNAL DIMENSION IN DIASTOLE: 5.14 CM (ref 3.5–6)
LEFT VENTRICULAR MASS: 184.14 G
LV LATERAL E/E' RATIO: 9 M/S
LV SEPTAL E/E' RATIO: 10.13 M/S
LVED V (TEICH): 126 ML
LVES V (TEICH): 38.96 ML
LVOT MG: 3.36 MMHG
LVOT MV: 0.84 CM/S
MV PEAK A VEL: 0.87 M/S
MV PEAK E VEL: 0.81 M/S
MV STENOSIS PRESSURE HALF TIME: 83.44 MS
MV VALVE AREA P 1/2 METHOD: 2.64 CM2
OHS CV RV/LV RATIO: 0.76 CM
PISA TR MAX VEL: 1.82 M/S
PULM VEIN S/D RATIO: 1.16
PV PEAK D VEL: 0.5 M/S
PV PEAK S VEL: 0.58 M/S
RA PRESSURE ESTIMATED: 3 MMHG
RIGHT VENTRICLE DIASTOLIC LENGTH: 6.3 CM
RIGHT VENTRICLE DIASTOLIC MID DIMENSION: 2.3 CM
RIGHT VENTRICULAR END-DIASTOLIC DIMENSION: 3.91 CM
RIGHT VENTRICULAR LENGTH IN DIASTOLE (APICAL 4-CHAMBER VIEW): 6.3 CM
RV MID DIAMA: 2.34 CM
RV TB RVSP: 5 MMHG
RV TISSUE DOPPLER FREE WALL SYSTOLIC VELOCITY 1 (APICAL 4 CHAMBER VIEW): 15.96 CM/S
SINUS: 3.2 CM
STJ: 3 CM
TDI LATERAL: 0.09 M/S
TDI SEPTAL: 0.08 M/S
TDI: 0.09 M/S
TR MAX PG: 13 MMHG
TRICUSPID ANNULAR PLANE SYSTOLIC EXCURSION: 2.21 CM
TV REST PULMONARY ARTERY PRESSURE: 16 MMHG
Z-SCORE OF LEFT VENTRICULAR DIMENSION IN END DIASTOLE: -1.47
Z-SCORE OF LEFT VENTRICULAR DIMENSION IN END SYSTOLE: -1.22

## 2024-10-01 ENCOUNTER — OFFICE VISIT (OUTPATIENT)
Dept: CARDIOLOGY | Facility: CLINIC | Age: 69
End: 2024-10-01
Payer: MEDICARE

## 2024-10-01 VITALS
WEIGHT: 179.69 LBS | DIASTOLIC BLOOD PRESSURE: 63 MMHG | BODY MASS INDEX: 25.16 KG/M2 | HEIGHT: 71 IN | SYSTOLIC BLOOD PRESSURE: 122 MMHG | HEART RATE: 67 BPM

## 2024-10-01 DIAGNOSIS — Z87.440 HISTORY OF UTI: ICD-10-CM

## 2024-10-01 DIAGNOSIS — R39.14 BENIGN PROSTATIC HYPERPLASIA WITH INCOMPLETE BLADDER EMPTYING: Primary | ICD-10-CM

## 2024-10-01 DIAGNOSIS — E10.649 TYPE 1 DIABETES MELLITUS WITH HYPOGLYCEMIA AND WITHOUT COMA: ICD-10-CM

## 2024-10-01 DIAGNOSIS — N40.1 BENIGN PROSTATIC HYPERPLASIA WITH URINARY HESITANCY: ICD-10-CM

## 2024-10-01 DIAGNOSIS — R39.11 BENIGN PROSTATIC HYPERPLASIA WITH URINARY HESITANCY: ICD-10-CM

## 2024-10-01 DIAGNOSIS — N40.1 BENIGN PROSTATIC HYPERPLASIA WITH INCOMPLETE BLADDER EMPTYING: Primary | ICD-10-CM

## 2024-10-01 DIAGNOSIS — Z87.442 HISTORY OF KIDNEY STONES: ICD-10-CM

## 2024-10-01 PROCEDURE — 1160F RVW MEDS BY RX/DR IN RCRD: CPT | Mod: CPTII,S$GLB,, | Performed by: INTERNAL MEDICINE

## 2024-10-01 PROCEDURE — 1126F AMNT PAIN NOTED NONE PRSNT: CPT | Mod: CPTII,S$GLB,, | Performed by: INTERNAL MEDICINE

## 2024-10-01 PROCEDURE — 99999 PR PBB SHADOW E&M-EST. PATIENT-LVL III: CPT | Mod: PBBFAC,,, | Performed by: INTERNAL MEDICINE

## 2024-10-01 PROCEDURE — 1159F MED LIST DOCD IN RCRD: CPT | Mod: CPTII,S$GLB,, | Performed by: INTERNAL MEDICINE

## 2024-10-01 PROCEDURE — 3008F BODY MASS INDEX DOCD: CPT | Mod: CPTII,S$GLB,, | Performed by: INTERNAL MEDICINE

## 2024-10-01 PROCEDURE — 3074F SYST BP LT 130 MM HG: CPT | Mod: CPTII,S$GLB,, | Performed by: INTERNAL MEDICINE

## 2024-10-01 PROCEDURE — 3078F DIAST BP <80 MM HG: CPT | Mod: CPTII,S$GLB,, | Performed by: INTERNAL MEDICINE

## 2024-10-01 PROCEDURE — 99214 OFFICE O/P EST MOD 30 MIN: CPT | Mod: S$GLB,,, | Performed by: INTERNAL MEDICINE

## 2024-10-01 NOTE — PROGRESS NOTES
Subjective:    Patient ID:  Juventino Amos is a 68 y.o. male patient here for evaluation Follow-up      History of Present Illness:  Follow-up test results.  Holter monitor with  SVT, nonsustained, PACs.  Holter monitor otherwise benign.  Echo with preserved ejection fraction.  No structural valvular heart issues.  History of left heart catheterization 5-7 years ago no significant coronary artery disease.  Risk factors include diabetes mellitus, dyslipidemia.             Review of patient's allergies indicates:  No Known Allergies    Past Medical History:   Diagnosis Date    Arthritis      Past Surgical History:   Procedure Laterality Date    TONSILLECTOMY      Urolift       Social History     Tobacco Use    Smoking status: Never    Smokeless tobacco: Never   Substance Use Topics    Alcohol use: Yes     Comment: socially - Friday night - small glass of Amanda    Drug use: Never        Review of Systems:    As noted in HPI in addition      REVIEW OF SYSTEMS  Review of Systems   Constitutional: Negative for decreased appetite, diaphoresis, night sweats, weight gain and weight loss.   HENT:  Negative for nosebleeds and odynophagia.    Eyes:  Negative for double vision and photophobia.   Cardiovascular:  Negative for chest pain, claudication, cyanosis, dyspnea on exertion, irregular heartbeat, leg swelling, near-syncope, orthopnea, palpitations, paroxysmal nocturnal dyspnea and syncope.   Respiratory:  Negative for cough, hemoptysis, shortness of breath and wheezing.    Hematologic/Lymphatic: Negative for adenopathy.   Skin:  Negative for flushing, skin cancer and suspicious lesions.   Musculoskeletal:  Negative for gout, myalgias and neck pain.   Gastrointestinal:  Negative for abdominal pain, heartburn, hematemesis and hematochezia.   Genitourinary:  Negative for bladder incontinence, hesitancy and nocturia.   Neurological:  Negative for focal weakness, headaches, light-headedness and paresthesias.  "  Psychiatric/Behavioral:  Negative for memory loss and substance abuse.               Objective:        Vitals:    10/01/24 1006   BP: 122/63   Pulse: 67       No results found for: "WBC", "HGB", "HCT", "PLT", "CHOL", "TRIG", "HDL", "LDL", "NONHDL", "ALT", "AST", "NA", "K", "CL", "CREATININE", "BUN", "CO2", "TSH", "PSA", "INR", "GLUF", "HGBA1C", "MICROALBUR"     ECHOCARDIOGRAM RESULTS  Results for orders placed during the hospital encounter of 09/24/24    Echo    Interpretation Summary    Left Ventricle: The left ventricle is normal in size. Normal wall thickness. There is normal systolic function with a visually estimated ejection fraction of 60 - 65%. There is normal diastolic function.    Right Ventricle: Normal right ventricular cavity size. Wall thickness is normal. Systolic function is normal.    Left Atrium: Left atrium is mildly dilated.    Pulmonary Artery: No pulmonary hypertension. The estimated pulmonary artery systolic pressure is 16 mmHg.    IVC/SVC: Normal venous pressure at 3 mmHg.    No results found for this or any previous visit.          CURRENT/PREVIOUS VISIT EKG  Results for orders placed or performed in visit on 09/04/24   IN OFFICE EKG 12-LEAD (to [place])    Collection Time: 09/04/24  1:43 PM   Result Value Ref Range    QRS Duration 84 ms    [facility] QTC Calculation 422 ms    Narrative    Test Reason : I48.0,    Vent. Rate : 058 BPM     Atrial Rate : 058 BPM     P-R Int : 126 ms          QRS Dur : 084 ms      QT Int : 430 ms       P-R-T Axes : 059 057 050 degrees     QTc Int : 422 ms    Sinus bradycardia  Otherwise normal ECG  No previous ECGs available  Confirmed by Leonidas Torrez MD ([id number]) on 9/9/2024 1:20:40 PM    Referred By: CARMITA [facility]           Confirmed By:Leonidas Torrez MD     No valid procedures specified.   No results found for this or any previous visit.    No valid procedures specified.    PHYSICAL EXAM  GENERAL: well built, well nourished, well-developed in no apparent distress alert " and oriented.   HEENT: Normocephalic. Pupils normal and conjunctivae normal.  Mucous membranes normal, no cyanosis or icterus, trachea central,no pallor or icterus is noted..   NECK: No JVD. No bruit..   THYROID: Thyroid not enlarged. No nodules present..   CARDIAC:  Normal S1-S2.  No murmur rub click or gallop.  PMI nondisplaced.   URINARY: No sarabia catheter   EXTREMITIES: No cyanosis, clubbing or edema noted at this time., no calf tenderness bilaterally.   PERIPHERAL VASCULAR SYSTEM: Good palpable distal pulses.  2+ femoral, popliteal and pedal pulses.  No bruits    CENTRAL NERVOUS SYSTEM: No focal motor or sensory deficits noted.   SKIN: Skin without lesions, moist, well perfused.   MUSCLE STRENGTH & TONE: No noteable weakness, atrophy or abnormal movement    I HAVE REVIEWED :    The vital signs, nurses notes, and all the pertinent radiology and labs.         Current Outpatient Medications   Medication Instructions    apixaban (ELIQUIS) 5 mg Tab     atorvastatin (LIPITOR) 10 mg    blood-glucose sensor (FREESTYLE HAILEE 3 SENSOR MISC)     co-enzyme Q-10 30 mg, Daily    finasteride (PROSCAR) 5 mg, Oral, Daily    flecainide (TAMBOCOR) 150 MG Tab PRN    insulin degludec (TRESIBA FLEXTOUCH U-100) 15 Units, Daily    insulin lispro (HUMALOG KWIKPEN INSULIN) 100 unit/mL pen Uses sliding scale    metFORMIN (GLUCOPHAGE) 500 mg, With breakfast    sulfamethoxazole-trimethoprim 800-160mg (BACTRIM DS) 800-160 mg Tab 1 tablet, Oral, 2 times daily    tamsulosin (FLOMAX) 0.4 mg, 2 times daily          Assessment:   PACs, nonsustained SVT.  Diabetes mellitus, dyslipidemia.      Plan:   Continue present medications using Tambocor as pill pocket, call sustained arrhythmia.  Indication for chronic anticoagulation at this time    Exam review of systems otherwise stable.  See me again in six-month.  Use Dash[employer] mobile device as needed.        No follow-ups on file.

## 2024-11-07 ENCOUNTER — OFFICE VISIT (OUTPATIENT)
Dept: UROLOGY | Facility: CLINIC | Age: 69
End: 2024-11-07
Payer: MEDICARE

## 2024-11-07 VITALS — WEIGHT: 173.94 LBS | HEIGHT: 71 IN | BODY MASS INDEX: 24.35 KG/M2

## 2024-11-07 DIAGNOSIS — R39.14 BENIGN PROSTATIC HYPERPLASIA WITH INCOMPLETE BLADDER EMPTYING: Primary | ICD-10-CM

## 2024-11-07 DIAGNOSIS — Z12.5 PROSTATE CANCER SCREENING: ICD-10-CM

## 2024-11-07 DIAGNOSIS — N40.1 BENIGN PROSTATIC HYPERPLASIA WITH INCOMPLETE BLADDER EMPTYING: Primary | ICD-10-CM

## 2024-11-07 LAB
BILIRUBIN, UA POC OHS: NEGATIVE
BLOOD, UA POC OHS: ABNORMAL
CLARITY, UA POC OHS: CLEAR
COLOR, UA POC OHS: YELLOW
GLUCOSE, UA POC OHS: >=1000
KETONES, UA POC OHS: 15
LEUKOCYTES, UA POC OHS: ABNORMAL
NITRITE, UA POC OHS: NEGATIVE
PH, UA POC OHS: 6
POC RESIDUAL URINE VOLUME: 190 ML (ref 0–100)
PROTEIN, UA POC OHS: >=300
SPECIFIC GRAVITY, UA POC OHS: 1.02
UROBILINOGEN, UA POC OHS: 0.2

## 2024-11-07 PROCEDURE — 87086 URINE CULTURE/COLONY COUNT: CPT

## 2024-11-07 PROCEDURE — 99999 PR PBB SHADOW E&M-EST. PATIENT-LVL III: CPT | Mod: PBBFAC,,,

## 2024-11-07 RX ORDER — CEPHALEXIN 500 MG/1
500 CAPSULE ORAL EVERY 8 HOURS
Qty: 30 CAPSULE | Refills: 0 | Status: SHIPPED | OUTPATIENT
Start: 2024-11-07 | End: 2024-11-17

## 2024-11-07 NOTE — PROGRESS NOTES
"[facility] Note  Staff: RONNIE Garcia    PCP: DO Da    Chief Complaint:  BPH    Subjective:        HPI: Juventino Amos is a 69 y.o. male presents today for BPH follow-up.  He is accompanied by his wife.  At his last office visit he was started on finasteride 5 mg daily.  He states he did notice some improvement in his symptoms but states he is now back to his original urinary complaints.  He does have a history of UroLift done in [place].  He states he did not receive any symptom relief from UroLift procedure.  Previously he was not interested in further evaluation for other urological procedures.  Today he states he is ready to pursue other options.    He states he had to use self catheterization earlier today.  He complains of abdominal pain and pressure, urgency, and difficulty urinating.  His PVR today is 190 mL.  Previously he was over 300.    He is due to update PSA but we discussed waiting until results of urine culture to rule out infection.    Questions asked the pt during ov today:  Urgency:  Yes, urge incontinence?  No  NTF: 3-4x night  Dysuria: No  Gross Hematuria:No  Straining:Yes , Hesistancy:Yes , Intermittency:Yes }, Weak stream:No    Last PSA Screening: No results found for: "PSA", "PSADIAG"    AUA SS Today:  32  Feeling of ICBE:  5  Frequency:  5  Intermittency:  5  Urgency:  4  Weak urine stream:  3  Strainin  Nocturia:  5    PVR by bladder scan performed by MA today:  >190 mL    REVIEW OF SYSTEMS:  Review of Systems   Constitutional: Negative.  Negative for chills and fever.   Gastrointestinal:  Positive for abdominal pain. Negative for constipation, diarrhea, nausea and vomiting.   Genitourinary:  Positive for frequency and urgency. Negative for dysuria, flank pain and hematuria.   Musculoskeletal: Negative.  Negative for back pain.       PMHx:  Past Medical History:   Diagnosis Date    Arthritis        PSHx:  Past Surgical History:   Procedure Laterality " Date    TONSILLECTOMY      Urolift         Fam Hx:   malignancies: No    kidney stones: No     Soc Hx:  , lives in [place]    Allergies:  Patient has no known allergies.    Medications: reviewed     Objective:   There were no vitals filed for this visit.    Physical Exam  Constitutional:       Appearance: Normal appearance.   Abdominal:      General: There is no distension.      Palpations: Abdomen is soft.      Tenderness: There is no abdominal tenderness.   Musculoskeletal:         General: Normal range of motion.      Cervical back: Normal range of motion.   Skin:     General: Skin is warm.   Neurological:      Mental Status: He is alert and oriented to person, place, and time.   Psychiatric:         Mood and Affect: Mood normal.         Behavior: Behavior normal.         LABS REVIEW:  UA today:  Color:Clear, Yellow  Spec. Grav.  1.020  PH  6.0  Negative for nitrates, urobili, bili  Positive glucose  Positive ketones and protein  Trace leuks  Large blood      Assessment:       1. Benign prostatic hyperplasia with incomplete bladder emptying    2. Prostate cancer screening          Plan:     Urine sent for culture  Keflex 500 mg 3 times a day times 10 days prescribed empirically  Pelvic ultrasound ordered and scheduled to estimate prostate size  Cystoscopy ordered and scheduled  Continue tamsulosin 0.8 mg and finasteride 5 mg daily as prescribed    F/u per treatment plan    [facility]:  Active    RONNIE Garcia

## 2024-11-08 LAB — BACTERIA UR CULT: NO GROWTH

## 2024-11-12 ENCOUNTER — HOSPITAL ENCOUNTER (OUTPATIENT)
Dept: RADIOLOGY | Facility: HOSPITAL | Age: 69
Discharge: HOME | End: 2024-11-12
Payer: MEDICARE

## 2024-11-12 DIAGNOSIS — R39.14 BENIGN PROSTATIC HYPERPLASIA WITH INCOMPLETE BLADDER EMPTYING: ICD-10-CM

## 2024-11-12 DIAGNOSIS — N40.1 BENIGN PROSTATIC HYPERPLASIA WITH INCOMPLETE BLADDER EMPTYING: ICD-10-CM

## 2024-11-12 PROCEDURE — 76857 US EXAM PELVIC LIMITED: CPT | Mod: 26,,, | Performed by: RADIOLOGY

## 2024-11-12 PROCEDURE — 76857 US EXAM PELVIC LIMITED: CPT | Mod: TC,PO

## 2024-11-15 ENCOUNTER — PROCEDURE VISIT (OUTPATIENT)
Dept: UROLOGY | Facility: CLINIC | Age: 69
End: 2024-11-15
Payer: MEDICARE

## 2024-11-15 ENCOUNTER — TELEPHONE (OUTPATIENT)
Dept: UROLOGY | Facility: CLINIC | Age: 69
End: 2024-11-15

## 2024-11-15 VITALS — HEIGHT: 71 IN | BODY MASS INDEX: 24.97 KG/M2 | WEIGHT: 178.38 LBS

## 2024-11-15 DIAGNOSIS — N40.1 BENIGN PROSTATIC HYPERPLASIA WITH INCOMPLETE BLADDER EMPTYING: ICD-10-CM

## 2024-11-15 DIAGNOSIS — R39.14 BENIGN PROSTATIC HYPERPLASIA WITH INCOMPLETE BLADDER EMPTYING: ICD-10-CM

## 2024-11-15 DIAGNOSIS — R39.14 BENIGN PROSTATIC HYPERPLASIA WITH INCOMPLETE BLADDER EMPTYING: Primary | ICD-10-CM

## 2024-11-15 DIAGNOSIS — N40.1 BPH WITH URINARY OBSTRUCTION: ICD-10-CM

## 2024-11-15 DIAGNOSIS — N40.1 BENIGN PROSTATIC HYPERPLASIA WITH INCOMPLETE BLADDER EMPTYING: Primary | ICD-10-CM

## 2024-11-15 DIAGNOSIS — N13.8 BPH WITH URINARY OBSTRUCTION: ICD-10-CM

## 2024-11-15 DIAGNOSIS — N40.0 BPH (BENIGN PROSTATIC HYPERPLASIA): ICD-10-CM

## 2024-11-15 DIAGNOSIS — N40.1 BENIGN PROSTATIC HYPERPLASIA WITH LOWER URINARY TRACT SYMPTOMS, SYMPTOM DETAILS UNSPECIFIED: Primary | ICD-10-CM

## 2024-11-15 RX ORDER — PHENAZOPYRIDINE HYDROCHLORIDE 100 MG/1
100 TABLET, FILM COATED ORAL 3 TIMES DAILY PRN
Qty: 21 TABLET | Refills: 0 | Status: SHIPPED | OUTPATIENT
Start: 2024-11-15 | End: 2024-11-22

## 2024-11-15 NOTE — PROCEDURES
Cystoscopy    Date/Time: 11/15/2024 9:00 AM    Performed by: Alex Plata MD  Authorized by: Maxine Hilton NP      Procedure:   Flexible cysto-urethroscopy    Pre Procedure Diagnosis:  BPH, LUTS, recurrent UTIs, and history of prior urologic surgery    Post Procedure Diagnosis:  Same and exposed foreign body    Surgeon: Alex Plata MD     Anesthesia: 2% uro-jet lidocaine jelly for local analgesia    Procedure note:  The risks and benefits were explained and informed consent was obtained. 2% lidocaine urojet was used for local analgesia. The genitalia was prepped and draped in the sterile fashion.    The flexible scope was advanced into the urethra and into the bladder. There were no urethral strictures noted throughout the course of the urethra.    The bladder was completely surveyed in a systematic fashion. The ureteral orifices were identified in their normal orthotopic locations bilaterally. There were no foreign bodies or stones. There were severe trabeculations and wide mouth diverticula toward the dome of the bladder. No bladder tumors or lesions suspicious for malignancy were seen.     Upon retroflexion there was significant median lobe enlargement with exposed urolift clip with calcifications. As the flexible cystoscope was being withdrawn, the bladder neck and prostate were evaluated. The lateral lobes of the prostate were significantly enlarged.     With a comfortably full bladder, the patient was asked to spontaneously void, and he voided with results listed below.. Results were recorded with the calibrated electronic uroflowmeter.     Complex Uroflowmetry:        Maximum flow: 6.3 mL/sec Mean flow: 3 mL/sec   Voided volume: 204 mL PVR: 348 mL   Pattern: continuous         The patient tolerated the procedure well without complication.     Findings in summary:            Assessment:  Intravesical prostate, exposed UroLift clips was calcifications, severely trabeculated bladder, low-flow on uroflow  with elevated PVR.    IPSS 32/6.    Assessment & Plan    - Juventino has enlarged prostate (81 cc) with intravesical protrusion and exposed Urolift clip from previous procedure  - Current Urolift not optimal treatment; recommended more definitive intervention due to prostate size and retained urine  - Discussed options. Recommend Aquablation procedure to remove prostate tissue and exposed clips    - Explained cystoscopy findings, including enlarged prostate, intravesical protrusion, and exposed Urolift clip  - Discussed bladder trabeculations as indicator of long-term bladder damage  - Outlined Aquablation procedure, including use of ultrasound mapping and water jet tissue removal  - Reviewed potential risks and side effects of proposed procedure, including erectile dysfunction (1%), retrograde ejaculation (10-15%), urethral stricture (5%), and stress incontinence (1%)    - Juventino to avoid heavy lifting or straining for first 2 weeks post-procedure    - Started Uribel for urinary discomfort  - Recommend OTC Azo as alternative for urinary discomfort    - Follow up to schedule Aquablation procedure, targeting December 18th   - cardiac clearance, hold Eliquis prior

## 2024-11-18 ENCOUNTER — TELEPHONE (OUTPATIENT)
Dept: UROLOGY | Facility: CLINIC | Age: 69
End: 2024-11-18
Payer: MEDICARE

## 2024-11-25 ENCOUNTER — LAB VISIT (OUTPATIENT)
Dept: LAB | Facility: HOSPITAL | Age: 69
End: 2024-11-25
Attending: STUDENT IN AN ORGANIZED HEALTH CARE EDUCATION/TRAINING PROGRAM
Payer: MEDICARE

## 2024-11-25 DIAGNOSIS — E10.649 TYPE 1 DIABETES MELLITUS WITH HYPOGLYCEMIA AND WITHOUT COMA: ICD-10-CM

## 2024-11-25 LAB
ALBUMIN SERPL BCP-MCNC: 3.4 G/DL (ref 3.5–5.2)
ALP SERPL-CCNC: 72 U/L (ref 40–150)
ALT SERPL W/O P-5'-P-CCNC: 30 U/L (ref 10–44)
ANION GAP SERPL CALC-SCNC: 7 MMOL/L (ref 8–16)
AST SERPL-CCNC: 27 U/L (ref 10–40)
BILIRUB SERPL-MCNC: 0.6 MG/DL (ref 0.1–1)
BUN SERPL-MCNC: 15 MG/DL (ref 8–23)
C PEPTIDE SERPL-MCNC: 0.32 NG/ML (ref 0.78–5.19)
CALCIUM SERPL-MCNC: 9.1 MG/DL (ref 8.7–10.5)
CHLORIDE SERPL-SCNC: 108 MMOL/L (ref 95–110)
CHOLEST SERPL-MCNC: 142 MG/DL (ref 120–199)
CHOLEST/HDLC SERPL: 3.4 {RATIO} (ref 2–5)
CO2 SERPL-SCNC: 24 MMOL/L (ref 23–29)
CREAT SERPL-MCNC: 1 MG/DL (ref 0.5–1.4)
EST. GFR  (NO RACE VARIABLE): >60 ML/MIN/1.73 M^2
ESTIMATED AVG GLUCOSE: 180 MG/DL (ref 68–131)
ESTIMATED AVG GLUCOSE: 180 MG/DL (ref 68–131)
GLUCOSE SERPL-MCNC: 126 MG/DL (ref 70–110)
HBA1C MFR BLD: 7.9 % (ref 4–5.6)
HBA1C MFR BLD: 7.9 % (ref 4–5.6)
HDLC SERPL-MCNC: 42 MG/DL (ref 40–75)
HDLC SERPL: 29.6 % (ref 20–50)
LDLC SERPL CALC-MCNC: 88 MG/DL (ref 63–159)
NONHDLC SERPL-MCNC: 100 MG/DL
POTASSIUM SERPL-SCNC: 4.6 MMOL/L (ref 3.5–5.1)
PROT SERPL-MCNC: 6.6 G/DL (ref 6–8.4)
SODIUM SERPL-SCNC: 139 MMOL/L (ref 136–145)
TRIGL SERPL-MCNC: 60 MG/DL (ref 30–150)

## 2024-11-25 PROCEDURE — 84681 ASSAY OF C-PEPTIDE: CPT | Performed by: NURSE PRACTITIONER

## 2024-11-25 PROCEDURE — 86341 ISLET CELL ANTIBODY: CPT | Performed by: NURSE PRACTITIONER

## 2024-11-25 PROCEDURE — 80053 COMPREHEN METABOLIC PANEL: CPT | Performed by: NURSE PRACTITIONER

## 2024-11-25 PROCEDURE — 83036 HEMOGLOBIN GLYCOSYLATED A1C: CPT | Performed by: NURSE PRACTITIONER

## 2024-11-25 PROCEDURE — 80061 LIPID PANEL: CPT | Performed by: NURSE PRACTITIONER

## 2024-11-29 LAB — GAD65 AB SER-SCNC: 0 NMOL/L

## 2024-12-04 ENCOUNTER — OFFICE VISIT (OUTPATIENT)
Dept: ENDOCRINOLOGY | Facility: CLINIC | Age: 69
End: 2024-12-04
Payer: MEDICARE

## 2024-12-04 ENCOUNTER — PATIENT MESSAGE (OUTPATIENT)
Dept: UROLOGY | Facility: CLINIC | Age: 69
End: 2024-12-04
Payer: MEDICARE

## 2024-12-04 VITALS
BODY MASS INDEX: 25.44 KG/M2 | SYSTOLIC BLOOD PRESSURE: 122 MMHG | HEIGHT: 71 IN | HEART RATE: 62 BPM | DIASTOLIC BLOOD PRESSURE: 68 MMHG | OXYGEN SATURATION: 98 % | WEIGHT: 181.69 LBS

## 2024-12-04 DIAGNOSIS — E10.649 TYPE 1 DIABETES MELLITUS WITH HYPOGLYCEMIA AND WITHOUT COMA: Primary | ICD-10-CM

## 2024-12-04 DIAGNOSIS — I48.0 PAROXYSMAL ATRIAL FIBRILLATION: ICD-10-CM

## 2024-12-04 PROCEDURE — 95251 CONT GLUC MNTR ANALYSIS I&R: CPT | Mod: S$GLB,,, | Performed by: NURSE PRACTITIONER

## 2024-12-04 PROCEDURE — 3288F FALL RISK ASSESSMENT DOCD: CPT | Mod: CPTII,S$GLB,, | Performed by: NURSE PRACTITIONER

## 2024-12-04 PROCEDURE — 3078F DIAST BP <80 MM HG: CPT | Mod: CPTII,S$GLB,, | Performed by: NURSE PRACTITIONER

## 2024-12-04 PROCEDURE — 1126F AMNT PAIN NOTED NONE PRSNT: CPT | Mod: CPTII,S$GLB,, | Performed by: NURSE PRACTITIONER

## 2024-12-04 PROCEDURE — 3051F HG A1C>EQUAL 7.0%<8.0%: CPT | Mod: CPTII,S$GLB,, | Performed by: NURSE PRACTITIONER

## 2024-12-04 PROCEDURE — 1101F PT FALLS ASSESS-DOCD LE1/YR: CPT | Mod: CPTII,S$GLB,, | Performed by: NURSE PRACTITIONER

## 2024-12-04 PROCEDURE — 3008F BODY MASS INDEX DOCD: CPT | Mod: CPTII,S$GLB,, | Performed by: NURSE PRACTITIONER

## 2024-12-04 PROCEDURE — 1159F MED LIST DOCD IN RCRD: CPT | Mod: CPTII,S$GLB,, | Performed by: NURSE PRACTITIONER

## 2024-12-04 PROCEDURE — 3074F SYST BP LT 130 MM HG: CPT | Mod: CPTII,S$GLB,, | Performed by: NURSE PRACTITIONER

## 2024-12-04 PROCEDURE — 99213 OFFICE O/P EST LOW 20 MIN: CPT | Mod: S$GLB,,, | Performed by: NURSE PRACTITIONER

## 2024-12-04 NOTE — PROGRESS NOTES
CC: This 69 y.o. male presents for management of diabetes  and chronic conditions pending review including afib    HPI: He was diagnosed with T2DM in his mid 30s. Then was told he was ~ 1.5 ~ 3 years ago. Has never been hospitalized r/t DM.  Family hx of DM: father and sisters x 2   Moved from [place] in May of 2024     Since last visit he did have prostatitis since last visit, was on antibiotics   Met w DM edu for pump eval at last visit  Was waiting for Omni pod or Mobi to integrate w Saba  Wearing Freestyle Saba 3- see download in media tab  BG > 250   13%  BG > 180   29%  Bg   58%  BG < 70     <1%  GMI 7.5%  pp excursions noted  Missed sometimes late CHO entry    Diet: Eats 3 Meals a day, snacks- BT popcorn  Exercise: very active- 25542 steps daily, golfing, Pickle ball, bikes  CURRENT DM MEDS: Tresiba 15u qam, Humalog 6-8u AC, metformin 500 mg bid- last dose was yesterday   Timing prandial insulin 5-15 minutes before meals: mostly just after or in the middle of a meal  Vial/pen:  Uses pens   Glucometer type:      Standards of Care:  Eye exam: 12/2023 - Will see Dr Valdez this month  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   MS/Neuro: Denies numbness/ tingling in BLE; Gait steady, speech clear  Other systems: negative.    PE:  GENERAL: Well developed, well nourished.  PSYCH: AAOx3, appropriate mood and affect, pleasant expression, conversant, appears relaxed, well groomed.   EYES: Conjunctiva, corneas clear  NECK: Supple, trachea midline  NEURO: Gait steady  SKIN:   no acanthosis nigracans.  FOOT EXAMINATION: 8/20/2024  No foot deformity, corns or callus formation,  nails in good condition and well trimmed, no interspace maceration or ulceration noted.  " Decreased hair growth present over toes/feet.   Protective sensation intact with 10 gram monofilament.  +2 dorsalis pedis and posterior pulses noted.     Personally reviewed Past Medical, Surgical, Social History.    /68 (BP Location: Right arm, Patient Position: Sitting)   Pulse 62   Ht 5' 11" (1.803 m)   Wt 82.4 kg (181 lb 10.5 oz)   SpO2 98%   BMI 25.34 kg/m²      Personally reviewed the below labs:      Chemistry        Component Value Date/Time     11/25/2024 0902    K 4.6 11/25/2024 0902     11/25/2024 0902    CO2 24 11/25/2024 0902    BUN 15 11/25/2024 0902    CREATININE 1.0 11/25/2024 0902     (H) 11/25/2024 0902        Component Value Date/Time    CALCIUM 9.1 11/25/2024 0902    ALKPHOS 72 11/25/2024 0902    AST 27 11/25/2024 0902    ALT 30 11/25/2024 0902    BILITOT 0.6 11/25/2024 0902            No results found for: "TSH"    Recent Labs   Lab 11/25/24  0902   LDL Cholesterol 88.0   HDL 42   Cholesterol 142        No results found for this or any previous visit.  No results found for this or any previous visit.    No results found for: "MICALBCREAT"    Hemoglobin A1C   Date Value Ref Range Status   11/25/2024 7.9 (H) 4.0 - 5.6 % Final     Comment:     ADA Screening Guidelines:  5.7-6.4%  Consistent with prediabetes  >or=6.5%  Consistent with diabetes    High levels of fetal hemoglobin interfere with the HbA1C  assay. Heterozygous hemoglobin variants (HbS, HgC, etc)do  not significantly interfere with this assay.   However, presence of multiple variants may affect accuracy.     11/25/2024 7.9 (H) 4.0 - 5.6 % Final     Comment:     ADA Screening Guidelines:  5.7-6.4%  Consistent with prediabetes  >or=6.5%  Consistent with diabetes    High levels of fetal hemoglobin interfere with the HbA1C  assay. Heterozygous hemoglobin variants (HbS, HgC, etc)do  not significantly interfere with this assay.   However, presence of multiple variants may affect accuracy.          ASSESSMENT and " PLAN:      1. T1DM with hyperglycemia, hypoglycemia     Continue Tresiba  15 u qam, Humalog 6-8u AC- Try to give prior to the meal  Continue Saba 3  Needs to be better about giving insulin prior to meals and snacks   Waiting for Mobi in integrate for Saba, should be 1st qtr 2025    2. Afib- following w Dr Lopez, optimize bg       Follow-up: in 3 months with A1C

## 2024-12-10 ENCOUNTER — LAB VISIT (OUTPATIENT)
Dept: LAB | Facility: HOSPITAL | Age: 69
End: 2024-12-10
Payer: MEDICARE

## 2024-12-10 DIAGNOSIS — Z12.5 PROSTATE CANCER SCREENING: ICD-10-CM

## 2024-12-10 LAB — COMPLEXED PSA SERPL-MCNC: 1.9 NG/ML (ref 0–4)

## 2024-12-10 PROCEDURE — 84153 ASSAY OF PSA TOTAL: CPT

## 2024-12-10 PROCEDURE — 36415 COLL VENOUS BLD VENIPUNCTURE: CPT | Mod: PO

## 2024-12-11 ENCOUNTER — TELEPHONE (OUTPATIENT)
Dept: UROLOGY | Facility: CLINIC | Age: 69
End: 2024-12-11
Payer: MEDICARE

## 2024-12-23 ENCOUNTER — CLINICAL SUPPORT (OUTPATIENT)
Dept: UROLOGY | Facility: CLINIC | Age: 69
End: 2024-12-23
Payer: MEDICARE

## 2024-12-23 ENCOUNTER — PATIENT MESSAGE (OUTPATIENT)
Dept: ENDOCRINOLOGY | Facility: CLINIC | Age: 69
End: 2024-12-23
Payer: MEDICARE

## 2024-12-23 DIAGNOSIS — N40.0 BENIGN PROSTATIC HYPERPLASIA, UNSPECIFIED WHETHER LOWER URINARY TRACT SYMPTOMS PRESENT: Primary | ICD-10-CM

## 2024-12-23 DIAGNOSIS — E10.649 TYPE 1 DIABETES MELLITUS WITH HYPOGLYCEMIA AND WITHOUT COMA: Primary | ICD-10-CM

## 2024-12-23 PROCEDURE — 99999 PR PBB SHADOW E&M-EST. PATIENT-LVL II: CPT | Mod: PBBFAC,,,

## 2024-12-23 RX ORDER — BLOOD-GLUCOSE SENSOR
EACH MISCELLANEOUS
Qty: 6 EACH | Refills: 3 | Status: SHIPPED | OUTPATIENT
Start: 2024-12-23

## 2024-12-23 NOTE — PROGRESS NOTES
Pt came in for voiding trial. Withdrew  52 Cc from balloon and removed 24 Fr sarabia catheter intact. Provided voiding trial instruction. Pt expressed understanding and tolerated well.

## 2024-12-27 ENCOUNTER — PATIENT MESSAGE (OUTPATIENT)
Dept: UROLOGY | Facility: CLINIC | Age: 69
End: 2024-12-27
Payer: MEDICARE

## 2025-01-31 ENCOUNTER — OFFICE VISIT (OUTPATIENT)
Dept: UROLOGY | Facility: CLINIC | Age: 70
End: 2025-01-31
Payer: MEDICARE

## 2025-01-31 VITALS — BODY MASS INDEX: 25.46 KG/M2 | WEIGHT: 181.88 LBS | HEIGHT: 71 IN

## 2025-01-31 DIAGNOSIS — N13.8 BPH WITH URINARY OBSTRUCTION: Primary | ICD-10-CM

## 2025-01-31 DIAGNOSIS — Z87.440 HISTORY OF UTI: ICD-10-CM

## 2025-01-31 DIAGNOSIS — N40.1 BPH WITH URINARY OBSTRUCTION: Primary | ICD-10-CM

## 2025-01-31 DIAGNOSIS — Z90.79 S/P TURP: ICD-10-CM

## 2025-01-31 PROBLEM — R39.11 BENIGN PROSTATIC HYPERPLASIA WITH URINARY HESITANCY: Status: RESOLVED | Noted: 2024-08-18 | Resolved: 2025-01-31

## 2025-01-31 LAB
BILIRUBIN, UA POC OHS: NEGATIVE
BLOOD, UA POC OHS: ABNORMAL
CLARITY, UA POC OHS: ABNORMAL
COLOR, UA POC OHS: YELLOW
GLUCOSE, UA POC OHS: NEGATIVE
KETONES, UA POC OHS: NEGATIVE
LEUKOCYTES, UA POC OHS: ABNORMAL
NITRITE, UA POC OHS: NEGATIVE
PH, UA POC OHS: 6.5
POC RESIDUAL URINE VOLUME: 28 ML (ref 0–100)
PROTEIN, UA POC OHS: 30
SPECIFIC GRAVITY, UA POC OHS: 1.01
UROBILINOGEN, UA POC OHS: 0.2

## 2025-01-31 PROCEDURE — 1126F AMNT PAIN NOTED NONE PRSNT: CPT | Mod: CPTII,S$GLB,, | Performed by: STUDENT IN AN ORGANIZED HEALTH CARE EDUCATION/TRAINING PROGRAM

## 2025-01-31 PROCEDURE — 51798 US URINE CAPACITY MEASURE: CPT | Mod: S$GLB,,, | Performed by: STUDENT IN AN ORGANIZED HEALTH CARE EDUCATION/TRAINING PROGRAM

## 2025-01-31 PROCEDURE — 1159F MED LIST DOCD IN RCRD: CPT | Mod: CPTII,S$GLB,, | Performed by: STUDENT IN AN ORGANIZED HEALTH CARE EDUCATION/TRAINING PROGRAM

## 2025-01-31 PROCEDURE — 99213 OFFICE O/P EST LOW 20 MIN: CPT | Mod: S$GLB,,, | Performed by: STUDENT IN AN ORGANIZED HEALTH CARE EDUCATION/TRAINING PROGRAM

## 2025-01-31 PROCEDURE — 81003 URINALYSIS AUTO W/O SCOPE: CPT | Mod: QW,S$GLB,, | Performed by: STUDENT IN AN ORGANIZED HEALTH CARE EDUCATION/TRAINING PROGRAM

## 2025-01-31 PROCEDURE — 3288F FALL RISK ASSESSMENT DOCD: CPT | Mod: CPTII,S$GLB,, | Performed by: STUDENT IN AN ORGANIZED HEALTH CARE EDUCATION/TRAINING PROGRAM

## 2025-01-31 PROCEDURE — 1101F PT FALLS ASSESS-DOCD LE1/YR: CPT | Mod: CPTII,S$GLB,, | Performed by: STUDENT IN AN ORGANIZED HEALTH CARE EDUCATION/TRAINING PROGRAM

## 2025-01-31 PROCEDURE — 99999 PR PBB SHADOW E&M-EST. PATIENT-LVL III: CPT | Mod: PBBFAC,,, | Performed by: STUDENT IN AN ORGANIZED HEALTH CARE EDUCATION/TRAINING PROGRAM

## 2025-01-31 PROCEDURE — 3008F BODY MASS INDEX DOCD: CPT | Mod: CPTII,S$GLB,, | Performed by: STUDENT IN AN ORGANIZED HEALTH CARE EDUCATION/TRAINING PROGRAM

## 2025-01-31 PROCEDURE — 1160F RVW MEDS BY RX/DR IN RCRD: CPT | Mod: CPTII,S$GLB,, | Performed by: STUDENT IN AN ORGANIZED HEALTH CARE EDUCATION/TRAINING PROGRAM

## 2025-01-31 PROCEDURE — G2211 COMPLEX E/M VISIT ADD ON: HCPCS | Mod: S$GLB,,, | Performed by: STUDENT IN AN ORGANIZED HEALTH CARE EDUCATION/TRAINING PROGRAM

## 2025-01-31 NOTE — PROGRESS NOTES
[place] - Urology   Clinic Note    Subjective:     Chief Complaint: Post-op Evaluation      History of Present Illness    Juventino presents for a follow-up visit approximately 6 weeks after undergoing an Aquablation procedure for prostate-related urinary symptoms.    He has BPH and underwent Aquablation on 12/18/24. Prostate volume was 81 cc.   Pre-op IPSS 32/6. PVR was 193 mL. IPSS today is 14/2. PVR 28 mL.     Juventino reports significant improvement in urinary symptoms since the Aquablation procedure.  He initially had irritative symptoms which have significantly improved. Currently, he has some urgency with prompt urination when needed, but may feel the need to urinate again shortly after with a significantly smaller volume. This pattern of frequent urination can persist for 5-10 minutes before subsiding.    Nocturia has improved, now urinating twice during the night compared to 4 or more times before the procedure. He notes improved ability to play golf without frequent urinary interruptions.    Juventino describes his urinary stream as directionally inconsistent but strong.    He regrets not pursuing the Aquablation procedure earlier, as it has significantly improved his quality of life.    A bladder scan today showed residual volume of 28 mL. A previous bladder scans were 193 mL.     IPSS Questionnaire (AUA-SS):  1) Incomplete Emptying 1 - Less than 1 time in 5   2) Frequency 2 - Less than half the time   3) Intermittency 4 - More than half the time   4) Urgency 5 - Almost always   5) Weak Stream  0 - Not at all   6) Straining 0 - Not at all   7) Nocturia 2 - 2 times   Total score:   14   Quality of Life:  2 - Mostly Satisfied                Past medical, family, surgical and social history reviewed as documented in chart with pertinent positive medical, family, surgical and social history detailed in HPI.    A review systems was conducted with pertinent positive and negative findings documented in HPI.    Objective:  "    Estimated body mass index is 25.37 kg/m² as calculated from the following:    Height as of this encounter: 5' 11" (1.803 m).    Weight as of this encounter: 82.5 kg (181 lb 14.1 oz).    Vital Signs (Most Recent)       General: No acute distress, well developed.   Head: Normocephalic, atraumatic  CV: no cyanosis  Lungs: normal respiratory effort, no respiratory distress    Labs reviewed below:  Lab Results   Component Value Date    BUN 15 11/25/2024    CREATININE 1.0 11/25/2024    AST 27 11/25/2024    ALT 30 11/25/2024    ALKPHOS 72 11/25/2024    ALBUMIN 3.4 (L) 11/25/2024    HGBA1C 7.9 (H) 11/25/2024    HGBA1C 7.9 (H) 11/25/2024        PSA trend reviewed below:  Lab Results   Component Value Date    PSA 1.9 12/10/2024      Assessment:     1. BPH with urinary obstruction    2. History of UTI    3. S/P TURP      Plan:     Assessment & Plan    - Assessed patient's progress 6 weeks post-Aquablation procedure  - Noted significant improvement in urinary symptoms, including reduced frequency and urgency  - Attributed symptoms primarily to previously identified median lobe and stones attached to Urolift, rather than potential kidney stones  - Evaluated bladder emptying, noting improvement with post-void residual of 28 mL  - Reviewed pathology results, confirming benign findings with some inflammation  - Determined medication cessation appropriate at this stage  - Considered potential need for overactive bladder medications in the future if symptoms persist    - Explained typical recovery timeline for Aquablation, including potential for continued improvement in bladder stability and healing of the prostatic fossa for up to 3 months post-procedure  - Discussed possibility of persistent overactive bladder symptoms and potential future treatment options    - Performed bladder scan to assess post-void residual volume    - Discontinued all medications related to urinary symptoms    - Follow up in 3 months to reassess urinary " symptoms and consider need for overactive bladder medications if symptoms are still bothersome          Portions of this note were generated by [employer] Direct dictation services    Alex Plata MD

## 2025-02-17 ENCOUNTER — PATIENT MESSAGE (OUTPATIENT)
Dept: ADMINISTRATIVE | Facility: HOSPITAL | Age: 70
End: 2025-02-17
Payer: MEDICARE

## 2025-02-18 ENCOUNTER — PATIENT MESSAGE (OUTPATIENT)
Dept: ENDOCRINOLOGY | Facility: CLINIC | Age: 70
End: 2025-02-18
Payer: MEDICARE

## 2025-02-19 ENCOUNTER — TELEPHONE (OUTPATIENT)
Dept: ENDOCRINOLOGY | Facility: CLINIC | Age: 70
End: 2025-02-19
Payer: MEDICARE

## 2025-02-19 DIAGNOSIS — E10.649 TYPE 1 DIABETES MELLITUS WITH HYPOGLYCEMIA AND WITHOUT COMA: Primary | ICD-10-CM

## 2025-02-19 RX ORDER — INSULIN PMP CART,AUT,G6/7,CNTR
EACH SUBCUTANEOUS
Qty: 1 EACH | Refills: 0 | Status: SHIPPED | OUTPATIENT
Start: 2025-02-19

## 2025-02-19 RX ORDER — INSULIN PMP CART,AUT,G6/7,CNTR
EACH SUBCUTANEOUS
Qty: 30 EACH | Refills: 3 | Status: SHIPPED | OUTPATIENT
Start: 2025-02-19

## 2025-02-19 NOTE — TELEPHONE ENCOUNTER
----- Message from Kelli sent at 2/19/2025  8:35 AM CST -----  Contact: pt  Type: Needs Medical Advice Who Called: Diomedes Call Back Number:[phone] Information: Requesting a call back regarding pt received the notice rx for insulin  but is not sure what the next step is. Pt asking for office to please call him Please Advise- Thank you

## 2025-02-19 NOTE — TELEPHONE ENCOUNTER
Spoke to patient to answer questions he had regarding starting on the Omni Pod 5.  He was not aware that he would need to switch to Saba 2 from Saba 3 in order to be compatible with Omni Pod.  We would also need to get Melissa to switch the scripts to Saba compatible pods and starter kit at that point if he decides to stay with Saba.  He states that he would like to try the Dexcom G7 before changing prescriptions.  Scheduled him for sample G7 start next week and then he will let us know how he would like to proceed from there.  Encouraged him to call with questions in the interim.

## 2025-02-24 ENCOUNTER — PATIENT MESSAGE (OUTPATIENT)
Dept: DIABETES | Facility: CLINIC | Age: 70
End: 2025-02-24

## 2025-02-24 ENCOUNTER — CLINICAL SUPPORT (OUTPATIENT)
Dept: DIABETES | Facility: CLINIC | Age: 70
End: 2025-02-24
Payer: MEDICARE

## 2025-02-24 VITALS — WEIGHT: 181.88 LBS | BODY MASS INDEX: 25.46 KG/M2 | HEIGHT: 71 IN

## 2025-02-24 DIAGNOSIS — E10.649 TYPE 1 DIABETES MELLITUS WITH HYPOGLYCEMIA AND WITHOUT COMA: Primary | ICD-10-CM

## 2025-02-24 PROCEDURE — G0108 DIAB MANAGE TRN  PER INDIV: HCPCS | Mod: S$GLB,,, | Performed by: DIETITIAN, REGISTERED

## 2025-02-24 PROCEDURE — 99999 PR PBB SHADOW E&M-EST. PATIENT-LVL I: CPT | Mod: PBBFAC,,, | Performed by: DIETITIAN, REGISTERED

## 2025-02-24 NOTE — PROGRESS NOTES
"Diabetes Care Specialist Progress Note  Author: Lesia Mast RD, CDE  Date: 2/24/2025    Intake    Program Intake  Reason for Diabetes Program Visit:: Intervention-Patient last seen for dm education in 8/2024.  He is back today to have a Dexcom sample started.  He thinks he is ready to move forward with Omni Pod 5 but currently uses Libre3 so he would need to transition to Saba 2 or Dexcom for automated mode use.    Type of Intervention:: Individual  Individual: Device Training  Device Training: Personal CGM  Current diabetes risk level:: moderate  In the last month, have you used the ER or been admitted to the hospital: No  Permission to speak with others about care:: yes    Current Diabetes Treatment: Insulin, Oral Medications  Oral Medication Type/Dose:  Metformin  Method of insulin delivery?: Injections  Injection Type: Pens  Pen Type/Dose:  Treiba- 16 units daily Humalog- averages 8 with meals    Continuous Glucose Monitoring  Patient has CGM: Yes  Personal CGM type::  (Saba 3- willing to transition to Dexcom for compatibility with Omni Pod 5)  GMI Value: 8 %    Lab Results   Component Value Date    HGBA1C 7.9 (H) 11/25/2024    HGBA1C 7.9 (H) 11/25/2024     Weight: 82.5 kg (181 lb 14.1 oz)   Height: 5' 11" (180.3 cm)   Body mass index is 25.37 kg/m².    Lifestyle Coping Support & Clinical    Lifestyle/Coping/Support  Does anyone in your family have diabetes or does anyone in your family support you in your diabetes care?:  (wife supportive and present today)    Diabetes Self-Management Skills Assessment    Medication Skills Assessment  Patient is able to identify current diabetes medications, dosages, and appropriate timing of medications.: yes  Patient reports problems or concerns with current medication regimen.: no  Patient is  aware that some diabetes medications can cause low blood sugar?: Yes  Medication Skills Assessment Completed:: Yes  Assessment indicates:: Instruction Needed  Area of need?: " Yes    Home Blood Glucose Monitoring  Patient states that blood sugar is checked at home daily.: yes  Monitoring Method:: personal continuous glucose monitor  Personal CGM type::  (Saba 3- willing to transition to Dexcom for compatibility with Omni Pod 5)   What is your current Time in Range?:  (47%)  Home Blood Glucose Monitoring Skills Assessment Completed: : Yes  Assessment indicates:: Instruction Needed  Area of need?: Yes        Assessment Summary and Plan    Based on today's diabetes care assessment, the following areas of need were identified:      Identified Areas of Need      Medication/Current Diabetes Treatment: Yes-    Lifestyle Coping/Support:     Diabetes Disease Process/Treatment Options:     Nutrition/Healthy Eating:      Physical Activity/Exercise:      Home Blood Glucose Monitoring: Yes    Acute Complications:      Chronic Complications:       Today's interventions were provided through individual discussion, instruction, and written materials were provided.      Patient verbalized understanding of instruction and written materials.  Pt was able to return back demonstration of instructions today. Patient understood key points, needs reinforcement and further instruction.     Diabetes Self-Management Care Plan:    Today's Diabetes Self-Management Care Plan was developed with Juventino's input. Juventino has agreed to work toward the following goal(s) to improve his/her overall diabetes control.      Care Plan: Diabetes Management   Updates made since 2/25/2024 12:00 AM        Problem: Medications         Long-Range Goal: Patient Agrees to take insulin as prescribed.  Will consider insulin pump therapy by the end of the year when supplies and insulin run out.    Start Date: 8/21/2024   This Visit's Progress: On track   Priority: Medium   Barriers: No Barriers Identified   Note:    Patient is interested in an insulin pump and seen today to discuss insulin pump therapy.     -Covered expectations for insulin  pump approval by provider and insurance company such as: SMBG a min of qid, additional labs, insurance verification, possible costs associated with monthly pump supplies, and overall cost.     --Discussed basic details of pump therapy with patient.     -Reviewed current insulin pumps available: Medtronic 780G, Tandem T-slim X2 and Mobi, and OmniPod 5    -Reviewed hybrid closed-loop insulin pump systems.  Hybrid closed-loop systems have some hands-off (automated) actions. These capabilities can vary among systems. That said, basic communication happens between the CGM and pump, which can trigger some automatic adjustments. This helps to keep your glucose within a preset range. Pumps with this capability include: Medtronic 770G/780G with Guardian 3 or 4 CGM, Tandem T-Slim X2 integrated with Dexcom G6 CGM (Control IQ technology), and OmniPod 5 integrated with Dexcom G6 CGM.  Patient is currently wearing a Saba 3 cgm.  He likes Saba but would possibly be open to switching to Dexcom depending on timing with Saba compatibility.      -Reviewed terms such as insulin sensitivity factor (ISF), carbohydrate ratio, target glucose, bolus, basal, active insulin and insulin on board.  Explained importance of learning to carb count at meals to effectively enter carbs (grams) when bolusing at meals.      -Explained each insulin pumps allow for different max volumes of insulin in reservoir chamber. Medtronic and Tandem max volume is 300 units and Omnipod max volume is 200 units.     -Patient verbalized understanding of pump therapy and able to see and touch all demonstration pumps during visit today.      Patient's is most interested in the Tandem Mobi or Omni Pod 5 options.    The main reason for this is tubing length on other pumps.  He is active and unsure how he would do with the longer tubing option.  Patient's major concern is using up insulin and supplies he currently has. Patient given booklets on Tandem and Omni Pod 5  "products and asked to research the products encouraging patient to contact us with any additional questions.  In interim I will reach out to those reps to see when Saba integration is available for both.      2/24/25- Reviewed compatible cgm options with Omni Pod 5 which is what patient is leaning towards trying.  He is willing to try the Dexcom to see if he wants to move to that cgm vs going backwards to Saba 2.       Problem: Blood Glucose Self-Monitoring         Long-Range Goal: Patient will try Dexcom G7 to replace the Saba 3 he is currenlty using.    Start Date: 2/24/2025   Priority: Medium   Barriers: No Barriers Identified   Note:    PLACEMENT OF DEXCOM G7 PERSONAL CONTINOUS GLUCOSE MONITORING SYSTEM (CGMS).  Sample sensor used today.     Explained to patient the difference between sensor glucose and blood (capillary) glucose and how these 2 readings may not be the same.     Patient is here in clinic today for placement of personal continuous glucose monitoring system (CGMS).   Patient has Dexcom G7 , Sensors, & Transmitter. Patient will use his iphone to obtain continuous glucose readings. Patient verbalizes understanding to change sensor every 10 days. He is also aware that each time a new sensor is placed there is a 30 min warm up period. No calibration is necessary however patient can calibrate if he desires.  However, if patient calibrates Dexcom sensor, it is recommended to only calibrate once during the 10-day sensor wear as sensor is factory calibrated. Patient understands to avoid calibration when glucose levels changing rapidly.       A detailed explanation of Dexcom G7 Continuous Glucose Monitoring System was provided. Reviewed the Dexcom "Getting Started Guide" with patient and he has a written copy for home use.  Patient was allowed to practice, and time allowed to ask questions. Patient will notify Endocrinology if glucose levels are above 250 mg/dL consistently or if episode of " glucose less than 70 mg/dL presents. Patient verbalizes understanding.         High alert set at 250 mg/dL  Low alert set at 75 mg/dL  High rising alert: OFF  Low dropping alert: OFF  Signal Loss: On-20 min    Dexcom username: [email or web address]     An appropriate site was selected and prepared. Patient inserted sensor into right arm. Sensor will be changed by patient every 10 days.   Patient has set up personal Dexcom account, and linked data sharing to [facility] if using Dexcom mobile jake.  All questions answered.         Follow Up Plan     Patient will let Melissa know in the next day or two if he would like Dexcom G7 scripts sent in.   Scripts for Omni Pod 5 have been sent in to his pharmacy but with Dexcom compatible pods so he would either need to transition to Dexcom or get Saba compatible starter kit sent in instead.  He will let us know how he would like to proceed in the coming week.  Encouraged him to call in interim with questions/concerns.      Today's care plan and follow up schedule was discussed with patient.  Juventnio verbalized understanding of the care plan, goals, and agrees to follow up plan.        The patient was encouraged to communicate with his/her health care provider/physician and care team regarding his/her condition(s) and treatment.  I provided the patient with my contact information today and encouraged to contact me via phone or [facility] as needed.     Length of Visit   Total Time: 90 Minutes

## 2025-02-25 ENCOUNTER — PATIENT MESSAGE (OUTPATIENT)
Dept: DIABETES | Facility: CLINIC | Age: 70
End: 2025-02-25
Payer: MEDICARE

## 2025-02-25 ENCOUNTER — TELEPHONE (OUTPATIENT)
Dept: DIABETES | Facility: CLINIC | Age: 70
End: 2025-02-25
Payer: MEDICARE

## 2025-02-25 DIAGNOSIS — E10.649 TYPE 1 DIABETES MELLITUS WITH HYPOGLYCEMIA AND WITHOUT COMA: ICD-10-CM

## 2025-02-25 NOTE — TELEPHONE ENCOUNTER
Patient would like to move forward with transitioning to Dexcom from Saba due to starting the Omni Pod 5.  He is requesting the scripts for sensors and vials be sent to [facility] which is his default pharmacy.  Can you also write his starting orders for Omni Pod 5 start.  He is currenlty taking 16 units of Tresiba daily and averages 8 units of Humalog with meals.  Currently not carb counting but has in the past.      Thanks

## 2025-02-26 ENCOUNTER — TELEPHONE (OUTPATIENT)
Dept: ENDOCRINOLOGY | Facility: CLINIC | Age: 70
End: 2025-02-26
Payer: MEDICARE

## 2025-02-26 DIAGNOSIS — E10.649 TYPE 1 DIABETES MELLITUS WITH HYPOGLYCEMIA AND WITHOUT COMA: Primary | ICD-10-CM

## 2025-02-26 RX ORDER — BLOOD-GLUCOSE SENSOR
EACH MISCELLANEOUS
Qty: 9 EACH | Refills: 4 | Status: SHIPPED | OUTPATIENT
Start: 2025-02-26

## 2025-02-26 RX ORDER — BLOOD-GLUCOSE SENSOR
EACH MISCELLANEOUS
Qty: 9 EACH | Refills: 4 | Status: SHIPPED | OUTPATIENT
Start: 2025-02-26 | End: 2025-02-26 | Stop reason: SDUPTHER

## 2025-02-26 RX ORDER — INSULIN LISPRO 100 [IU]/ML
INJECTION, SOLUTION INTRAVENOUS; SUBCUTANEOUS
Qty: 60 ML | Refills: 3 | Status: SHIPPED | OUTPATIENT
Start: 2025-02-26

## 2025-02-28 ENCOUNTER — PATIENT MESSAGE (OUTPATIENT)
Dept: DIABETES | Facility: CLINIC | Age: 70
End: 2025-02-28
Payer: MEDICARE

## 2025-02-28 ENCOUNTER — PATIENT MESSAGE (OUTPATIENT)
Dept: ENDOCRINOLOGY | Facility: CLINIC | Age: 70
End: 2025-02-28
Payer: MEDICARE

## 2025-02-28 DIAGNOSIS — E10.649 TYPE 1 DIABETES MELLITUS WITH HYPOGLYCEMIA AND WITHOUT COMA: ICD-10-CM

## 2025-03-03 ENCOUNTER — PATIENT MESSAGE (OUTPATIENT)
Dept: DIABETES | Facility: CLINIC | Age: 70
End: 2025-03-03
Payer: MEDICARE

## 2025-03-03 RX ORDER — BLOOD-GLUCOSE SENSOR
EACH MISCELLANEOUS
Qty: 9 EACH | Refills: 4 | Status: SHIPPED | OUTPATIENT
Start: 2025-03-03

## 2025-03-05 ENCOUNTER — CLINICAL SUPPORT (OUTPATIENT)
Dept: DIABETES | Facility: CLINIC | Age: 70
End: 2025-03-05
Payer: MEDICARE

## 2025-03-05 DIAGNOSIS — E10.649 TYPE 1 DIABETES MELLITUS WITH HYPOGLYCEMIA AND WITHOUT COMA: Primary | ICD-10-CM

## 2025-03-05 DIAGNOSIS — Z46.81 INSULIN PUMP FITTING OR ADJUSTMENT: ICD-10-CM

## 2025-03-05 NOTE — PROGRESS NOTES
Diabetes Care Specialist Progress Note  Author: Lesia Mast RD, CDE  Date: 3/5/2025    Intake    Program Intake  Reason for Diabetes Program Visit:: Intervention- Patient wa last seen on 2/24 for insulin pump evaluation and Dexcom G7 start.  He is back today to get his new Omni Pod 5 insulin pump set up.  He will use the controller for now until G7 iphone jake update is available.    Type of Intervention:: Individual  Individual: Device Training  Device Training: Insulin Pump Start  Current diabetes risk level:: moderate  In the last month, have you used the ER or been admitted to the hospital: No  Permission to speak with others about care:: yes    Current Diabetes Treatment: Insulin  Method of insulin delivery?: Insulin Pump  Type of Pump:  Will be transitioning to Omni Pod 5.  Previously taking 16 units of Tresiba daily and 6-8 units of Humalog with meals  Does patient have back-up plan?: Yes  Any problems obtaining supplies?: No    Continuous Glucose Monitoring  Patient has CGM: Yes  Personal CGM type::  (Dexcom G7- has not been wearing it long enough for GMI)    Lab Results   Component Value Date    HGBA1C 7.9 (H) 11/25/2024    HGBA1C 7.9 (H) 11/25/2024     Diabetes Self-Management Skills Assessment    Medication Skills Assessment  Patient is able to identify current diabetes medications, dosages, and appropriate timing of medications.: yes  Patient is  aware that some diabetes medications can cause low blood sugar?: Yes  Medication Skills Assessment Completed:: Yes  Assessment indicates:: Instruction Needed  Area of need?: Yes    Home Blood Glucose Monitoring  Patient states that blood sugar is checked at home daily.: yes  Monitoring Method:: personal continuous glucose monitor  Personal CGM type::  (Dexcom G7- has not been wearing it long enough for GMI)  Home Blood Glucose Monitoring Skills Assessment Completed: : Yes  Assessment indicates:: Adequate understanding  Area of need?: Yes        Assessment  Summary and Plan    Based on today's diabetes care assessment, the following areas of need were identified:      Identified Areas of Need      Medication/Current Diabetes Treatment: Yes- see care plan   Lifestyle Coping/Support:     Diabetes Disease Process/Treatment Options:     Nutrition/Healthy Eating:      Physical Activity/Exercise:      Home Blood Glucose Monitoring: Yes - patient will continue with Dexcom G7/ awaiting for [facility]   Acute Complications:      Chronic Complications:       Today's interventions were provided through individual discussion, instruction, and written materials were provided.      Patient verbalized understanding of instruction and written materials.  Pt was able to return back demonstration of instructions today. Patient understood key points, needs reinforcement and further instruction.     Diabetes Self-Management Care Plan:    Today's Diabetes Self-Management Care Plan was developed with Juventino's input. Juventino has agreed to work toward the following goal(s) to improve his/her overall diabetes control.      Care Plan: Diabetes Management   Updates made since 3/5/2024 12:00 AM        Problem: Medications         Problem: Blood Glucose Self-Monitoring         Long-Range Goal: Patient will try Dexcom G7 to replace the Saba 3 he is currenlty using.    Start Date: 2/24/2025   This Visit's Progress: On track   Priority: Medium   Barriers: No Barriers Identified   Note:    PLACEMENT OF DEXCOM G7 PERSONAL CONTINOUS GLUCOSE MONITORING SYSTEM (CGMS).  Sample sensor used today.     Explained to patient the difference between sensor glucose and blood (capillary) glucose and how these 2 readings may not be the same.     Patient is here in clinic today for placement of personal continuous glucose monitoring system (CGMS).   Patient has Dexcom G7 , Sensors, & Transmitter. Patient will use his iphone to obtain continuous glucose readings. Patient verbalizes  "understanding to change sensor every 10 days. He is also aware that each time a new sensor is placed there is a 30 min warm up period. No calibration is necessary however patient can calibrate if he desires.  However, if patient calibrates Dexcom sensor, it is recommended to only calibrate once during the 10-day sensor wear as sensor is factory calibrated. Patient understands to avoid calibration when glucose levels changing rapidly.       A detailed explanation of Dexcom G7 Continuous Glucose Monitoring System was provided. Reviewed the Dexcom "Getting Started Guide" with patient and he has a written copy for home use.  Patient was allowed to practice, and time allowed to ask questions. Patient will notify Endocrinology if glucose levels are above 250 mg/dL consistently or if episode of glucose less than 70 mg/dL presents. Patient verbalizes understanding.         High alert set at 250 mg/dL  Low alert set at 75 mg/dL  High rising alert: OFF  Low dropping alert: OFF  Signal Loss: On-20 min    Dexcom username: [email or web address]     An appropriate site was selected and prepared. Patient inserted sensor into right arm. Sensor will be changed by patient every 10 days.   Patient has set up personal Dexcom account, and linked data sharing to [facility] if using Dexcom mobile jake.  All questions answered.      3/5/25- Patient waiting on supply of Dexcom to come in at [facility].  Provided him with a sample to use as his current sensor session will end tomorrow.  Reviewed steps he will go through to enter new sensor info into the controller once he changes his sensor.       Problem: Medications         Long-Range Goal: Patient will begin use of Omni Pod 5.    Start Date: 3/5/2025   Priority: Medium   Barriers: No Barriers Identified   Note:    OMNIPOD 5 INSULIN PUMP START  Pump training was provided per Omni Pod protocol.     Patient understands he will no longer take Tresiba injections daily.  He held his 16 unit dose " this am.    Details of pump therapy were covered included following: controller features and programming, pod activation, pod site selection and rotation, automatic pod priming and insertion, setting & editing basal rates in manual mode, giving bolus and other features in the set up menu.  Patient demonstrated ability to program controller, activate and insert pod using aseptic technique.  Patient demonstrated ability to program Dexcom transmitter into controller and start automated limited mode.    Instructed pt on use of basic pump features ie...give a bolus, pause insulin, switch from manual to automated mode.  Reviewed features available in manual mode verses automated mode.   Reviewed when and how to use activity function in automated mode.  Reviewed site selection of pods, rotation of sites and hard stop to change pod every 72 hrs.   Instructed that insulin vial is good out of refrigeration for up to 28-30 days .   Reviewed treatment of hypoglycemia, hyperglycemia; sick day care, DKA, and troubleshooting of pump.  Omni Pod 24 hour support can be reached at [phone].     INITIAL SETTINGS: (per provider Melissa Guerra)    Basal rate: .55u/hr  Maximum basal: 2.0 u/hr     Bolus Menu:  ISF: 1:45  Carb Ratio : 1:7 ( Regular meal use 45g of carb, small meal use 30g of carb, and snack ue 15g of carb)  Blood glucose target: 120; correct above: 120  Active insulin: 3 hrs  Maximum bolus = 15 units  Reverse Correction: on    Low pod insulin: 15 units  Pod expiration alarm:  4 hours    Podder ID username: [email or web address]    Silvio username: [email or web address]    Patient has written materials for Omnipod 5 for home use.  Patient verbalized understanding of all instructions given.  Reviewed back up plan in case of pump malfunction.  Educated that if glucose levels increasing and patient is delivering insulin, it is recommended to change pod.         Task: Omni Pod 5 set up completed Completed 3/5/2025        Follow Up Plan      Follow up in about 4 weeks (around 4/2/2025) or as needed sooner.  Will look at pump and Dexcom reports within 24 hours and discuss any questions or concerns with patient.  Next Endocrinology visit scheduled for 4/16.   We can attempt to move this up if needed depending on how he does with transition to his new pump.  Encouraged him to call or send a message in interim with questions/concerns.      Today's care plan and follow up schedule was discussed with patient.  Juventino verbalized understanding of the care plan, goals, and agrees to follow up plan.        The patient was encouraged to communicate with his/her health care provider/physician and care team regarding his/her condition(s) and treatment.  I provided the patient with my contact information today and encouraged to contact me via phone or [facility] Patient Portal as needed.     Length of Visit   Total Time: 120 Minutes

## 2025-03-06 ENCOUNTER — PATIENT MESSAGE (OUTPATIENT)
Dept: DIABETES | Facility: CLINIC | Age: 70
End: 2025-03-06
Payer: MEDICARE

## 2025-03-24 DIAGNOSIS — Z00.00 ENCOUNTER FOR MEDICARE ANNUAL WELLNESS EXAM: ICD-10-CM

## 2025-04-08 ENCOUNTER — PATIENT MESSAGE (OUTPATIENT)
Dept: CARDIOLOGY | Facility: CLINIC | Age: 70
End: 2025-04-08
Payer: MEDICARE

## 2025-04-09 ENCOUNTER — APPOINTMENT (OUTPATIENT)
Dept: LAB | Facility: HOSPITAL | Age: 70
End: 2025-04-09
Attending: NURSE PRACTITIONER
Payer: MEDICARE

## 2025-04-10 ENCOUNTER — OFFICE VISIT (OUTPATIENT)
Dept: CARDIOLOGY | Facility: CLINIC | Age: 70
End: 2025-04-10
Payer: MEDICARE

## 2025-04-10 VITALS
DIASTOLIC BLOOD PRESSURE: 62 MMHG | HEIGHT: 71 IN | WEIGHT: 183.44 LBS | HEART RATE: 58 BPM | SYSTOLIC BLOOD PRESSURE: 103 MMHG | BODY MASS INDEX: 25.68 KG/M2

## 2025-04-10 DIAGNOSIS — R06.00 DYSPNEA, UNSPECIFIED TYPE: ICD-10-CM

## 2025-04-10 DIAGNOSIS — R07.2 PRECORDIAL PAIN: ICD-10-CM

## 2025-04-10 DIAGNOSIS — E10.649 TYPE 1 DIABETES MELLITUS WITH HYPOGLYCEMIA AND WITHOUT COMA: Primary | ICD-10-CM

## 2025-04-10 DIAGNOSIS — I48.0 PAROXYSMAL ATRIAL FIBRILLATION: ICD-10-CM

## 2025-04-10 PROCEDURE — 3051F HG A1C>EQUAL 7.0%<8.0%: CPT | Mod: CPTII,S$GLB,, | Performed by: INTERNAL MEDICINE

## 2025-04-10 PROCEDURE — 3008F BODY MASS INDEX DOCD: CPT | Mod: CPTII,S$GLB,, | Performed by: INTERNAL MEDICINE

## 2025-04-10 PROCEDURE — 1159F MED LIST DOCD IN RCRD: CPT | Mod: CPTII,S$GLB,, | Performed by: INTERNAL MEDICINE

## 2025-04-10 PROCEDURE — 3288F FALL RISK ASSESSMENT DOCD: CPT | Mod: CPTII,S$GLB,, | Performed by: INTERNAL MEDICINE

## 2025-04-10 PROCEDURE — 3066F NEPHROPATHY DOC TX: CPT | Mod: CPTII,S$GLB,, | Performed by: INTERNAL MEDICINE

## 2025-04-10 PROCEDURE — 1101F PT FALLS ASSESS-DOCD LE1/YR: CPT | Mod: CPTII,S$GLB,, | Performed by: INTERNAL MEDICINE

## 2025-04-10 PROCEDURE — 93005 ELECTROCARDIOGRAM TRACING: CPT | Mod: PO

## 2025-04-10 PROCEDURE — 3078F DIAST BP <80 MM HG: CPT | Mod: CPTII,S$GLB,, | Performed by: INTERNAL MEDICINE

## 2025-04-10 PROCEDURE — 99214 OFFICE O/P EST MOD 30 MIN: CPT | Mod: S$GLB,,, | Performed by: INTERNAL MEDICINE

## 2025-04-10 PROCEDURE — 99999 PR PBB SHADOW E&M-EST. PATIENT-LVL III: CPT | Mod: PBBFAC,,, | Performed by: INTERNAL MEDICINE

## 2025-04-10 PROCEDURE — 3074F SYST BP LT 130 MM HG: CPT | Mod: CPTII,S$GLB,, | Performed by: INTERNAL MEDICINE

## 2025-04-10 PROCEDURE — 1126F AMNT PAIN NOTED NONE PRSNT: CPT | Mod: CPTII,S$GLB,, | Performed by: INTERNAL MEDICINE

## 2025-04-10 PROCEDURE — 3060F POS MICROALBUMINURIA REV: CPT | Mod: CPTII,S$GLB,, | Performed by: INTERNAL MEDICINE

## 2025-04-10 NOTE — PROGRESS NOTES
Subjective:    Patient ID:  Juventino Amos is a 69 y.o. male patient here for evaluation Palpitations (Episodes  heart rate goes 45 to 161 then lasts 2mintues)      History of Present Illness:  Follow-up.  History of SVT, PACs.  Apple watch with several week history of intermittent variations in heart rates with tachyarrhythmia.  Symptomatic with palpitations chest tightness.  Longstanding history of diabetes mellitus on insulin pump.    Past abnormal Holter monitor with frequent PACs, longest run of SVT 21 beats.  No documented sustained arrhythmia.     Family history, dyslipidemia        Review of patient's allergies indicates:  No Known Allergies    Past Medical History:   Diagnosis Date    Arthritis     Diabetes mellitus      Past Surgical History:   Procedure Laterality Date    CHOLECYSTECTOMY      2017    TONSILLECTOMY      TRANSURETHRAL WATERJET ABLATION OF PROSTATE N/A 12/18/2024    Procedure: TRANSURETHRAL WATERJET ABLATION OF PROSTATE;  Surgeon: Alex Plata MD;  Location: [facility];  Service: Urology;  Laterality: N/A;    Urolift       Social History[1]     Review of Systems:    As noted in HPI in addition      REVIEW OF SYSTEMS  Review of Systems   Constitutional: Positive for malaise/fatigue. Negative for decreased appetite, diaphoresis, night sweats, weight gain and weight loss.   HENT:  Negative for nosebleeds and odynophagia.    Eyes:  Negative for double vision and photophobia.   Cardiovascular:  Positive for chest pain and palpitations. Negative for claudication, cyanosis, dyspnea on exertion, irregular heartbeat, leg swelling, near-syncope, orthopnea, paroxysmal nocturnal dyspnea and syncope.   Respiratory:  Positive for shortness of breath. Negative for cough, hemoptysis and wheezing.    Hematologic/Lymphatic: Negative for adenopathy.   Skin:  Negative for flushing, skin cancer and suspicious lesions.   Musculoskeletal:  Negative for gout, myalgias and neck pain.   Gastrointestinal:  Negative for  abdominal pain, heartburn, hematemesis and hematochezia.   Genitourinary:  Negative for bladder incontinence, hesitancy and nocturia.   Neurological:  Negative for focal weakness, headaches, light-headedness and paresthesias.   Psychiatric/Behavioral:  Negative for memory loss and substance abuse.               Objective:        Vitals:    04/10/25 1354   BP: 103/62   Pulse: (!) 58       Lab Results   Component Value Date    CHOL 142 11/25/2024    TRIG 60 11/25/2024    HDL 42 11/25/2024    ALT 30 11/25/2024    AST 27 11/25/2024     11/25/2024    K 4.6 11/25/2024     11/25/2024    CREATININE 1.0 11/25/2024    BUN 15 11/25/2024    CO2 24 11/25/2024    PSA 1.9 12/10/2024    HGBA1C 7.0 (H) 04/09/2025        ECHOCARDIOGRAM RESULTS  Results for orders placed during the hospital encounter of 09/24/24    Echo    Interpretation Summary    Left Ventricle: The left ventricle is normal in size. Normal wall thickness. There is normal systolic function with a visually estimated ejection fraction of 60 - 65%. There is normal diastolic function.    Right Ventricle: Normal right ventricular cavity size. Wall thickness is normal. Systolic function is normal.    Left Atrium: Left atrium is mildly dilated.    Pulmonary Artery: No pulmonary hypertension. The estimated pulmonary artery systolic pressure is 16 mmHg.    IVC/SVC: Normal venous pressure at 3 mmHg.    No results found for this or any previous visit.          CURRENT/PREVIOUS VISIT EKG  Results for orders placed or performed in visit on 09/04/24   IN OFFICE EKG 12-LEAD (to [place])    Collection Time: 09/04/24  1:43 PM   Result Value Ref Range    QRS Duration 84 ms    OHS QTC Calculation 422 ms    Narrative    Test Reason : I48.0,    Vent. Rate : 058 BPM     Atrial Rate : 058 BPM     P-R Int : 126 ms          QRS Dur : 084 ms      QT Int : 430 ms       P-R-T Axes : 059 057 050 degrees     QTc Int : 422 ms    Sinus bradycardia  Otherwise normal ECG  No previous ECGs  available  Confirmed by Leonidas Torrez MD ([id number]) on 9/9/2024 1:20:40 PM    Referred By: CARMITA EAST           Confirmed By:Leonidas Torrez MD     No valid procedures specified.   No results found for this or any previous visit.    No valid procedures specified.    PHYSICAL EXAM  GENERAL: well built, well nourished, well-developed in no apparent distress alert and oriented.   HEENT: Normocephalic. Pupils normal and conjunctivae normal.  Mucous membranes normal, no cyanosis or icterus, trachea central,no pallor or icterus is noted..   NECK: No JVD. No bruit..   THYROID: Thyroid not enlarged. No nodules present..   CARDIAC:  Normal S1-S2.  No murmur rub click or gallop.  PMI nondisplaced.    LUNGS: Clear to auscultation. No wheezing or rhonchi..   ABDOMEN: Soft no masses or organomegaly.  No abdomen pulsations or bruits.  Normal bowel sounds. No pulsations and no masses felt, No guarding or rebound.   URINARY: No sarabia catheter   EXTREMITIES: No cyanosis, clubbing or edema noted at this time., no calf tenderness bilaterally.   PERIPHERAL VASCULAR SYSTEM: Good palpable distal pulses.  2+ femoral, popliteal and pedal pulses.  No bruits    CENTRAL NERVOUS SYSTEM: No focal motor or sensory deficits noted.   SKIN: Skin without lesions, moist, well perfused.   MUSCLE STRENGTH & TONE: No noteable weakness, atrophy or abnormal movement    I HAVE REVIEWED :    The vital signs, nurses notes, and all the pertinent radiology and labs.         Current Outpatient Medications   Medication Instructions    atorvastatin (LIPITOR) 10 mg    blood-glucose sensor (DEXCOM G7 SENSOR) Kanwal Change every 10 days    co-enzyme Q-10 30 mg, Daily    flecainide (TAMBOCOR) 150 MG Tab PRN    insulin degludec (TRESIBA FLEXTOUCH U-100) 15 Units, Daily    insulin lispro (HUMALOG KWIKPEN INSULIN) 100 unit/mL pen Uses sliding scale    insulin lispro 100 unit/mL injection Uses 65u/day in insulin pump    insulin  cart,aut,G6/7,cntr (OMNIPOD 5 G6-G7 INTRO  KT,GEN5,) Crtg Dispense 1 controller    insulin pump cart,auto,BT,G6/7 (OMNIPOD 5 G6-G7 PODS, GEN 5,) Crtg Changes pods q3days          Assessment:   Arrhythmia, nonsustained SVT documented on Holter monitor 2024.  Normal echo.  By history was using Tambocor pill in pocket for sustained arrhythmia.  Intermittent bradycardia.    Chest tightness.    Longstanding diabetes mellitus, dyslipidemia.  Positive family history.        Plan:   Holter monitor, GXT Cardiolite.  Echo.  Follow up results        No follow-ups on file.            [1]   Social History  Tobacco Use    Smoking status: Never    Smokeless tobacco: Never   Substance Use Topics    Alcohol use: Yes     Comment: socially    Drug use: Never

## 2025-04-11 ENCOUNTER — PATIENT MESSAGE (OUTPATIENT)
Dept: CARDIOLOGY | Facility: CLINIC | Age: 70
End: 2025-04-11
Payer: MEDICARE

## 2025-04-11 LAB
OHS QRS DURATION: 84 MS
OHS QTC CALCULATION: 450 MS

## 2025-04-16 ENCOUNTER — OFFICE VISIT (OUTPATIENT)
Dept: ENDOCRINOLOGY | Facility: CLINIC | Age: 70
End: 2025-04-16
Payer: MEDICARE

## 2025-04-16 VITALS
SYSTOLIC BLOOD PRESSURE: 138 MMHG | WEIGHT: 184.31 LBS | HEIGHT: 71 IN | OXYGEN SATURATION: 98 % | HEART RATE: 60 BPM | DIASTOLIC BLOOD PRESSURE: 58 MMHG | BODY MASS INDEX: 25.8 KG/M2

## 2025-04-16 DIAGNOSIS — E10.9 TYPE 1 DIABETES MELLITUS WITHOUT COMPLICATION: Primary | ICD-10-CM

## 2025-04-16 DIAGNOSIS — I48.0 PAROXYSMAL ATRIAL FIBRILLATION: ICD-10-CM

## 2025-04-16 DIAGNOSIS — Z96.41 INSULIN PUMP IN PLACE: ICD-10-CM

## 2025-04-16 PROBLEM — E10.649 TYPE 1 DIABETES MELLITUS WITH HYPOGLYCEMIA AND WITHOUT COMA: Status: RESOLVED | Noted: 2024-08-18 | Resolved: 2025-04-16

## 2025-04-16 RX ORDER — GLUCAGON INJECTION, SOLUTION 1 MG/.2ML
1 INJECTION, SOLUTION SUBCUTANEOUS ONCE AS NEEDED
Qty: 0.4 ML | Refills: 1 | Status: SHIPPED | OUTPATIENT
Start: 2025-04-16 | End: 2025-04-16

## 2025-04-16 RX ORDER — INSULIN DEGLUDEC 100 U/ML
INJECTION, SOLUTION SUBCUTANEOUS
Start: 2025-04-16

## 2025-04-16 RX ORDER — INSULIN PMP CART,AUT,G6/7,CNTR
EACH SUBCUTANEOUS
Qty: 30 EACH | Refills: 3 | Status: SHIPPED | OUTPATIENT
Start: 2025-04-16

## 2025-04-16 NOTE — PROGRESS NOTES
CC: This 69 y.o. male presents for management of diabetes  and chronic conditions pending review including afib    HPI: He was diagnosed with T2DM in his mid 30s. Then was told he was ~ 1.5 ~ 3 years ago. Has never been hospitalized r/t DM.  Family hx of DM: father and sisters x 2   Moved from [place] in May of 2024     Since last visit started on Dexcom G7 and Omni pod 5- see downloads in media tab  2% Very High  19% High  78% In Range  1% Low  0% Very Low  GMI  6.9 %   Bg very well controlled  Rare hypoglycemia  Pump suspending appropriately   Small pp excursions- resolve quickly     Diet: Eats 3 Meals a day, snacks-  Chocolate almonds  Exercise: very active- 94308 steps daily, golfing, Pickle ball, bikes, yard word   CURRENT DM MEDS: Humalog in Omni Pod 5  Basal rate  0.55 u/h  ISF 45  Carb ratio  7  Target 120  Active Ins 3 hrs  Timing prandial insulin 5-15 minutes before meals: yes  Vial/pen:  Uses pens   Glucometer type:      Standards of Care:  Eye exam: 12/2024 -  Dr Valdez      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   MS/Neuro: Denies numbness/ tingling in BLE; Gait steady, speech clear  Other systems: negative.    PE:  GENERAL: Well developed, well nourished.  PSYCH: AAOx3, appropriate mood and affect, pleasant expression, conversant, appears relaxed, well groomed.   EYES: Conjunctiva, corneas clear  NECK: Supple, trachea midline  NEURO: Gait steady  SKIN:   no acanthosis nigracans.  FOOT EXAMINATION: 8/20/2024  No foot deformity, corns or callus formation,  nails in good condition and well trimmed, no interspace maceration or ulceration noted.  Decreased hair growth present over toes/feet.   Protective sensation intact with 10  "gram monofilament.  +2 dorsalis pedis and posterior pulses noted.     Personally reviewed Past Medical, Surgical, Social History.    BP (!) 138/58 (BP Location: Left arm, Patient Position: Sitting)   Pulse 60   Ht 5' 11" (1.803 m)   Wt 83.6 kg (184 lb 4.9 oz)   SpO2 98%   BMI 25.71 kg/m²      Personally reviewed the below labs:      Chemistry        Component Value Date/Time     11/25/2024 0902    K 4.6 11/25/2024 0902     11/25/2024 0902    CO2 24 11/25/2024 0902    BUN 15 11/25/2024 0902    CREATININE 1.0 11/25/2024 0902     (H) 11/25/2024 0902        Component Value Date/Time    CALCIUM 9.1 11/25/2024 0902    ALKPHOS 72 11/25/2024 0902    AST 27 11/25/2024 0902    ALT 30 11/25/2024 0902    BILITOT 0.6 11/25/2024 0902            No results found for: "TSH"    Recent Labs   Lab 11/25/24  0902   LDL Cholesterol 88.0   HDL 42   Cholesterol 142        No results found for this or any previous visit.  No results found for this or any previous visit.    Lab Results   Component Value Date    MICALBCREAT 62.1 (H) 04/09/2025       Hemoglobin A1C   Date Value Ref Range Status   11/25/2024 7.9 (H) 4.0 - 5.6 % Final     Comment:     ADA Screening Guidelines:  5.7-6.4%  Consistent with prediabetes  >or=6.5%  Consistent with diabetes    High levels of fetal hemoglobin interfere with the HbA1C  assay. Heterozygous hemoglobin variants (HbS, HgC, etc)do  not significantly interfere with this assay.   However, presence of multiple variants may affect accuracy.     11/25/2024 7.9 (H) 4.0 - 5.6 % Final     Comment:     ADA Screening Guidelines:  5.7-6.4%  Consistent with prediabetes  >or=6.5%  Consistent with diabetes    High levels of fetal hemoglobin interfere with the HbA1C  assay. Heterozygous hemoglobin variants (HbS, HgC, etc)do  not significantly interfere with this assay.   However, presence of multiple variants may affect accuracy.       Hemoglobin A1c   Date Value Ref Range Status   04/09/2025 7.0 " (H) 4.0 - 5.6 % Final     Comment:     ADA Screening Guidelines:  5.7-6.4%  Consistent with prediabetes  >=6.5%  Consistent with diabetes    High levels of fetal hemoglobin interfere with the HbA1C  assay. Heterozygous hemoglobin variants (HbS, HgC, etc)do  not significantly interfere with this assay.   However, presence of multiple variants may affect accuracy.        ASSESSMENT and PLAN:      1. T1DM with hyperglycemia, hypoglycemia     Continue Dexcom G7 and Omni Pod 5  Rxs refilled  Repeat UMCR w RTC    2. Afib- following w Dr Lopez, Continue to optimize bg    3.Insulin pump as above         Follow-up: in 3 months with A1C

## 2025-04-22 ENCOUNTER — PATIENT MESSAGE (OUTPATIENT)
Dept: CARDIOLOGY | Facility: HOSPITAL | Age: 70
End: 2025-04-22
Payer: MEDICARE

## 2025-04-24 ENCOUNTER — PATIENT MESSAGE (OUTPATIENT)
Dept: CARDIOLOGY | Facility: CLINIC | Age: 70
End: 2025-04-24

## 2025-04-24 ENCOUNTER — HOSPITAL ENCOUNTER (OUTPATIENT)
Dept: CARDIOLOGY | Facility: HOSPITAL | Age: 70
Discharge: HOME | End: 2025-04-24
Attending: INTERNAL MEDICINE
Payer: MEDICARE

## 2025-04-24 ENCOUNTER — TELEPHONE (OUTPATIENT)
Dept: CARDIOLOGY | Facility: CLINIC | Age: 70
End: 2025-04-24

## 2025-04-24 ENCOUNTER — HOSPITAL ENCOUNTER (OUTPATIENT)
Dept: RADIOLOGY | Facility: HOSPITAL | Age: 70
Discharge: HOME | End: 2025-04-24
Attending: INTERNAL MEDICINE
Payer: MEDICARE

## 2025-04-24 ENCOUNTER — RESULTS FOLLOW-UP (OUTPATIENT)
Dept: CARDIOLOGY | Facility: CLINIC | Age: 70
End: 2025-04-24

## 2025-04-24 VITALS — HEIGHT: 71 IN | BODY MASS INDEX: 25.62 KG/M2 | WEIGHT: 183 LBS

## 2025-04-24 DIAGNOSIS — E10.649 TYPE 1 DIABETES MELLITUS WITH HYPOGLYCEMIA AND WITHOUT COMA: ICD-10-CM

## 2025-04-24 DIAGNOSIS — R06.00 DYSPNEA, UNSPECIFIED TYPE: ICD-10-CM

## 2025-04-24 DIAGNOSIS — I48.0 PAROXYSMAL ATRIAL FIBRILLATION: ICD-10-CM

## 2025-04-24 DIAGNOSIS — R07.2 PRECORDIAL PAIN: ICD-10-CM

## 2025-04-24 LAB
CV STRESS BASE HR: 57 BPM
DIASTOLIC BLOOD PRESSURE: 60 MMHG
NUC STRESS EJECTION FRACTION: 68 %
OHS CV CPX 1 MINUTE RECOVERY HEART RATE: 89 BPM
OHS CV CPX 85 PERCENT MAX PREDICTED HEART RATE MALE: 129
OHS CV CPX ESTIMATED METS: 12
OHS CV CPX MAX PREDICTED HEART RATE: 152
OHS CV CPX PATIENT IS FEMALE: 0
OHS CV CPX PATIENT IS MALE: 1
OHS CV CPX PEAK DIASTOLIC BLOOD PRESSURE: 60 MMHG
OHS CV CPX PEAK HEAR RATE: 176 BPM
OHS CV CPX PEAK RATE PRESSURE PRODUCT: NORMAL
OHS CV CPX PEAK SYSTOLIC BLOOD PRESSURE: 189 MMHG
OHS CV CPX PERCENT MAX PREDICTED HEART RATE ACHIEVED: 116
OHS CV CPX RATE PRESSURE PRODUCT PRESENTING: 7410
OHS CV INITIAL DOSE: 10.8 MCG/KG/MIN
OHS CV PEAK DOSE: 33 MCG/KG/MIN
STRESS ECHO POST EXERCISE DUR MIN: 7 MINUTES
STRESS ECHO POST EXERCISE DUR SEC: 28 SECONDS
STRESS ST DEPRESSION: 20 MM
SYSTOLIC BLOOD PRESSURE: 130 MMHG

## 2025-04-24 PROCEDURE — 93017 CV STRESS TEST TRACING ONLY: CPT | Mod: PO

## 2025-04-24 PROCEDURE — 93018 CV STRESS TEST I&R ONLY: CPT | Mod: ,,, | Performed by: INTERNAL MEDICINE

## 2025-04-24 PROCEDURE — A9502 TC99M TETROFOSMIN: HCPCS | Mod: PO | Performed by: INTERNAL MEDICINE

## 2025-04-24 PROCEDURE — 78452 HT MUSCLE IMAGE SPECT MULT: CPT | Mod: 26,,, | Performed by: INTERNAL MEDICINE

## 2025-04-24 PROCEDURE — 78452 HT MUSCLE IMAGE SPECT MULT: CPT | Mod: PO

## 2025-04-24 PROCEDURE — 93016 CV STRESS TEST SUPVJ ONLY: CPT | Mod: ,,, | Performed by: INTERNAL MEDICINE

## 2025-04-24 PROCEDURE — 93306 TTE W/DOPPLER COMPLETE: CPT | Mod: 26,,, | Performed by: INTERNAL MEDICINE

## 2025-04-24 PROCEDURE — 93306 TTE W/DOPPLER COMPLETE: CPT | Mod: PO

## 2025-04-24 RX ADMIN — TETROFOSMIN 10.8 MILLICURIE: 1.38 INJECTION, POWDER, LYOPHILIZED, FOR SOLUTION INTRAVENOUS at 02:04

## 2025-04-24 RX ADMIN — TETROFOSMIN 33 MILLICURIE: 1.38 INJECTION, POWDER, LYOPHILIZED, FOR SOLUTION INTRAVENOUS at 02:04

## 2025-04-24 NOTE — TELEPHONE ENCOUNTER
Spoke to pt in regards to scheduling appt in the next week to further discuss treatment with Dr. Lopez. Successfully schedule for 5/1

## 2025-04-25 LAB
ASCENDING AORTA: 2.7 CM
AV INDEX (PROSTH): 1.12
AV MEAN GRADIENT: 3 MMHG
AV PEAK GRADIENT: 7 MMHG
AV VALVE AREA BY VELOCITY RATIO: 4.2 CM²
AV VALVE AREA: 4.6 CM²
AV VELOCITY RATIO: 1
BSA FOR ECHO PROCEDURE: 2.04 M2
CV ECHO LV RWT: 0.33 CM
DOP CALC AO PEAK VEL: 1.3 M/S
DOP CALC AO VTI: 26.2 CM
DOP CALC LVOT AREA: 4.2 CM2
DOP CALC LVOT DIAMETER: 2.3 CM
DOP CALC LVOT PEAK VEL: 1.3 M/S
DOP CALC LVOT STROKE VOLUME: 121.7 CM3
DOP CALCLVOT PEAK VEL VTI: 29.3 CM
E WAVE DECELERATION TIME: 206 MSEC
E/A RATIO: 0.89
E/E' RATIO: 11 M/S
ECHO LV POSTERIOR WALL: 0.8 CM (ref 0.6–1.1)
FRACTIONAL SHORTENING: 34.7 % (ref 28–44)
INTERVENTRICULAR SEPTUM: 0.9 CM (ref 0.6–1.1)
LEFT ATRIUM AREA SYSTOLIC (APICAL 2 CHAMBER): 21.79 CM2
LEFT ATRIUM AREA SYSTOLIC (APICAL 4 CHAMBER): 21.59 CM2
LEFT ATRIUM SIZE: 4 CM
LEFT ATRIUM VOLUME INDEX MOD: 35 ML/M2
LEFT ATRIUM VOLUME MOD: 71 ML
LEFT INTERNAL DIMENSION IN SYSTOLE: 3.2 CM (ref 2.1–4)
LEFT VENTRICLE DIASTOLIC VOLUME INDEX: 56.16 ML/M2
LEFT VENTRICLE DIASTOLIC VOLUME: 114 ML
LEFT VENTRICLE END SYSTOLIC VOLUME APICAL 2 CHAMBER: 65.59 ML
LEFT VENTRICLE END SYSTOLIC VOLUME APICAL 4 CHAMBER: 69.49 ML
LEFT VENTRICLE MASS INDEX: 69.9 G/M2
LEFT VENTRICLE SYSTOLIC VOLUME INDEX: 20.7 ML/M2
LEFT VENTRICLE SYSTOLIC VOLUME: 42 ML
LEFT VENTRICULAR INTERNAL DIMENSION IN DIASTOLE: 4.9 CM (ref 3.5–6)
LEFT VENTRICULAR MASS: 141.9 G
LV LATERAL E/E' RATIO: 8.6 M/S
LV SEPTAL E/E' RATIO: 15.8 M/S
LVED V (TEICH): 113.7 ML
LVES V (TEICH): 41.7 ML
LVOT MG: 3.37 MMHG
LVOT MV: 0.87 CM/S
MV PEAK A VEL: 1.07 M/S
MV PEAK E VEL: 0.95 M/S
MV STENOSIS PRESSURE HALF TIME: 59.65 MS
MV VALVE AREA P 1/2 METHOD: 3.69 CM2
OHS CV RV/LV RATIO: 0.86 CM
PISA TR MAX VEL: 2.2 M/S
PULM VEIN S/D RATIO: 1.03
PV PEAK D VEL: 0.65 M/S
PV PEAK S VEL: 0.67 M/S
RA PRESSURE ESTIMATED: 3 MMHG
RA VOL SYS: 35.01 ML
RIGHT ATRIAL AREA: 14.7 CM2
RIGHT ATRIUM VOLUME AREA LENGTH APICAL 4 CHAMBER: 34.8 ML
RIGHT VENTRICLE DIASTOLIC BASEL DIMENSION: 4.2 CM
RIGHT VENTRICLE DIASTOLIC LENGTH: 7.7 CM
RIGHT VENTRICLE DIASTOLIC MID DIMENSION: 3.2 CM
RIGHT VENTRICULAR END-DIASTOLIC DIMENSION: 4.23 CM
RIGHT VENTRICULAR LENGTH IN DIASTOLE (APICAL 4-CHAMBER VIEW): 7.67 CM
RV MID DIAMA: 3.19 CM
RV TB RVSP: 5 MMHG
RV TISSUE DOPPLER FREE WALL SYSTOLIC VELOCITY 1 (APICAL 4 CHAMBER VIEW): 15.18 CM/S
SINUS: 2.98 CM
STJ: 2.6 CM
TDI LATERAL: 0.11 M/S
TDI SEPTAL: 0.06 M/S
TDI: 0.09 M/S
TR MAX PG: 20 MMHG
TRICUSPID ANNULAR PLANE SYSTOLIC EXCURSION: 2.5 CM
TV REST PULMONARY ARTERY PRESSURE: 22 MMHG
Z-SCORE OF LEFT VENTRICULAR DIMENSION IN END DIASTOLE: -2.05
Z-SCORE OF LEFT VENTRICULAR DIMENSION IN END SYSTOLE: -1.12

## 2025-04-28 ENCOUNTER — PATIENT MESSAGE (OUTPATIENT)
Dept: DIABETES | Facility: CLINIC | Age: 70
End: 2025-04-28
Payer: MEDICARE

## 2025-05-01 ENCOUNTER — OFFICE VISIT (OUTPATIENT)
Dept: UROLOGY | Facility: CLINIC | Age: 70
End: 2025-05-01
Payer: MEDICARE

## 2025-05-01 ENCOUNTER — OFFICE VISIT (OUTPATIENT)
Dept: CARDIOLOGY | Facility: CLINIC | Age: 70
End: 2025-05-01
Payer: MEDICARE

## 2025-05-01 VITALS — BODY MASS INDEX: 26.29 KG/M2 | WEIGHT: 187.81 LBS | HEIGHT: 71 IN

## 2025-05-01 VITALS
HEIGHT: 71 IN | WEIGHT: 187.81 LBS | BODY MASS INDEX: 26.29 KG/M2 | HEART RATE: 54 BPM | DIASTOLIC BLOOD PRESSURE: 62 MMHG | SYSTOLIC BLOOD PRESSURE: 130 MMHG

## 2025-05-01 DIAGNOSIS — Z90.79 S/P TURP: ICD-10-CM

## 2025-05-01 DIAGNOSIS — I48.0 PAROXYSMAL ATRIAL FIBRILLATION: Primary | ICD-10-CM

## 2025-05-01 DIAGNOSIS — R07.2 PRECORDIAL PAIN: ICD-10-CM

## 2025-05-01 DIAGNOSIS — N40.1 BPH WITH URINARY OBSTRUCTION: Primary | ICD-10-CM

## 2025-05-01 DIAGNOSIS — N13.8 BPH WITH URINARY OBSTRUCTION: ICD-10-CM

## 2025-05-01 DIAGNOSIS — N40.1 BPH WITH URINARY OBSTRUCTION: ICD-10-CM

## 2025-05-01 DIAGNOSIS — N13.8 BPH WITH URINARY OBSTRUCTION: Primary | ICD-10-CM

## 2025-05-01 DIAGNOSIS — Z87.440 HISTORY OF UTI: ICD-10-CM

## 2025-05-01 LAB — POC RESIDUAL URINE VOLUME: 85 ML (ref 0–100)

## 2025-05-01 PROCEDURE — 3008F BODY MASS INDEX DOCD: CPT | Mod: CPTII,S$GLB,, | Performed by: STUDENT IN AN ORGANIZED HEALTH CARE EDUCATION/TRAINING PROGRAM

## 2025-05-01 PROCEDURE — 99999 PR PBB SHADOW E&M-EST. PATIENT-LVL III: CPT | Mod: PBBFAC,,, | Performed by: INTERNAL MEDICINE

## 2025-05-01 PROCEDURE — 3288F FALL RISK ASSESSMENT DOCD: CPT | Mod: CPTII,S$GLB,, | Performed by: STUDENT IN AN ORGANIZED HEALTH CARE EDUCATION/TRAINING PROGRAM

## 2025-05-01 PROCEDURE — 3008F BODY MASS INDEX DOCD: CPT | Mod: CPTII,S$GLB,, | Performed by: INTERNAL MEDICINE

## 2025-05-01 PROCEDURE — 1126F AMNT PAIN NOTED NONE PRSNT: CPT | Mod: CPTII,S$GLB,, | Performed by: STUDENT IN AN ORGANIZED HEALTH CARE EDUCATION/TRAINING PROGRAM

## 2025-05-01 PROCEDURE — 99999 PR PBB SHADOW E&M-EST. PATIENT-LVL III: CPT | Mod: PBBFAC,,, | Performed by: STUDENT IN AN ORGANIZED HEALTH CARE EDUCATION/TRAINING PROGRAM

## 2025-05-01 PROCEDURE — 3066F NEPHROPATHY DOC TX: CPT | Mod: CPTII,S$GLB,, | Performed by: INTERNAL MEDICINE

## 2025-05-01 PROCEDURE — 99213 OFFICE O/P EST LOW 20 MIN: CPT | Mod: S$GLB,,, | Performed by: STUDENT IN AN ORGANIZED HEALTH CARE EDUCATION/TRAINING PROGRAM

## 2025-05-01 PROCEDURE — 3051F HG A1C>EQUAL 7.0%<8.0%: CPT | Mod: CPTII,S$GLB,, | Performed by: STUDENT IN AN ORGANIZED HEALTH CARE EDUCATION/TRAINING PROGRAM

## 2025-05-01 PROCEDURE — 99214 OFFICE O/P EST MOD 30 MIN: CPT | Mod: S$GLB,,, | Performed by: INTERNAL MEDICINE

## 2025-05-01 PROCEDURE — 1126F AMNT PAIN NOTED NONE PRSNT: CPT | Mod: CPTII,S$GLB,, | Performed by: INTERNAL MEDICINE

## 2025-05-01 PROCEDURE — 3051F HG A1C>EQUAL 7.0%<8.0%: CPT | Mod: CPTII,S$GLB,, | Performed by: INTERNAL MEDICINE

## 2025-05-01 PROCEDURE — 3066F NEPHROPATHY DOC TX: CPT | Mod: CPTII,S$GLB,, | Performed by: STUDENT IN AN ORGANIZED HEALTH CARE EDUCATION/TRAINING PROGRAM

## 2025-05-01 PROCEDURE — 51798 US URINE CAPACITY MEASURE: CPT | Mod: S$GLB,,, | Performed by: STUDENT IN AN ORGANIZED HEALTH CARE EDUCATION/TRAINING PROGRAM

## 2025-05-01 PROCEDURE — 3075F SYST BP GE 130 - 139MM HG: CPT | Mod: CPTII,S$GLB,, | Performed by: INTERNAL MEDICINE

## 2025-05-01 PROCEDURE — 1101F PT FALLS ASSESS-DOCD LE1/YR: CPT | Mod: CPTII,S$GLB,, | Performed by: INTERNAL MEDICINE

## 2025-05-01 PROCEDURE — 3288F FALL RISK ASSESSMENT DOCD: CPT | Mod: CPTII,S$GLB,, | Performed by: INTERNAL MEDICINE

## 2025-05-01 PROCEDURE — 1101F PT FALLS ASSESS-DOCD LE1/YR: CPT | Mod: CPTII,S$GLB,, | Performed by: STUDENT IN AN ORGANIZED HEALTH CARE EDUCATION/TRAINING PROGRAM

## 2025-05-01 PROCEDURE — 1159F MED LIST DOCD IN RCRD: CPT | Mod: CPTII,S$GLB,, | Performed by: STUDENT IN AN ORGANIZED HEALTH CARE EDUCATION/TRAINING PROGRAM

## 2025-05-01 PROCEDURE — 3060F POS MICROALBUMINURIA REV: CPT | Mod: CPTII,S$GLB,, | Performed by: INTERNAL MEDICINE

## 2025-05-01 PROCEDURE — 1159F MED LIST DOCD IN RCRD: CPT | Mod: CPTII,S$GLB,, | Performed by: INTERNAL MEDICINE

## 2025-05-01 PROCEDURE — 3078F DIAST BP <80 MM HG: CPT | Mod: CPTII,S$GLB,, | Performed by: INTERNAL MEDICINE

## 2025-05-01 PROCEDURE — 3060F POS MICROALBUMINURIA REV: CPT | Mod: CPTII,S$GLB,, | Performed by: STUDENT IN AN ORGANIZED HEALTH CARE EDUCATION/TRAINING PROGRAM

## 2025-05-01 NOTE — PROGRESS NOTES
"[facility] - Urology   Clinic Note    Subjective:     Chief Complaint: Benign Prostatic Hypertrophy      History of Present Illness    CHIEF COMPLAINT:  Juventino presents today for follow up after Aquablation    He has BPH and underwent Aquablation on 12/18/24. Prostate volume was 81 cc.  He previously had undergone UroLift with an outside provider and there were ectopic calcified UroLift implants.  Pre-op IPSS 32/6. PVR was 193 mL. IPSS today is 3/2.  Bladder scan today is 85 mL, however not a true PVR    UROLOGIC SYMPTOMS:  He reports good urinary stream with quick voiding and good urinary control. He has nocturia once nightly after 4-5 hours of sleep.    MEDICATIONS:  He reports all urinary medications were previously discontinued with good outcome.          Past medical, family, surgical and social history reviewed as documented in chart with pertinent positive medical, family, surgical and social history detailed in HPI.    A review systems was conducted with pertinent positive and negative findings documented in HPI.    Objective:     Estimated body mass index is 26.2 kg/m² as calculated from the following:    Height as of this encounter: 5' 11" (1.803 m).    Weight as of this encounter: 85.2 kg (187 lb 13.3 oz).    Vital Signs (Most Recent)       General: No acute distress, well developed.   Head: Normocephalic, atraumatic  CV: no cyanosis  Lungs: normal respiratory effort, no respiratory distress    Labs reviewed below:  Lab Results   Component Value Date    BUN 15 11/25/2024    CREATININE 1.0 11/25/2024    AST 27 11/25/2024    ALT 30 11/25/2024    ALKPHOS 72 11/25/2024    ALBUMIN 3.4 (L) 11/25/2024    HGBA1C 7.0 (H) 04/09/2025        PSA trend reviewed below:  Lab Results   Component Value Date    PSA 1.9 12/10/2024        Assessment:     1. BPH with urinary obstruction    2. History of UTI    3. S/P TURP      Plan:     Assessment & Plan    - Improved urinary symptoms post-procedure, with reduced nocturia and " improved stream strength.  - No current urologic medications required given symptom improvement.    - Educated on general prostate health maintenance strategies.     Follow up with me as needed         Portions of this note were generated by [employer] Direct dictation services    Alex Plata MD

## 2025-05-01 NOTE — PROGRESS NOTES
Subjective:    Patient ID:  Juventino Amos is a 69 y.o. male patient here for evaluation Follow-up (Test results)      History of Present Illness:.  Prior history of nonsustained SVT, uses pill in the pocket, Tambocor as needed.  No recent arrhythmia.  Longstanding history of diabetes mellitus dyslipidemia, family history.  Follow up test results with normal echo.  Suspect exercise study with inferior wall perfusion defect, rule out attenuation.    Active without complaints.  No exertional chest pain shortness breath no PND orthopnea.             Review of patient's allergies indicates:  No Known Allergies    Past Medical History:   Diagnosis Date    Arthritis     Diabetes mellitus      Past Surgical History:   Procedure Laterality Date    CHOLECYSTECTOMY      2017    TONSILLECTOMY      TRANSURETHRAL WATERJET ABLATION OF PROSTATE N/A 12/18/2024    Procedure: TRANSURETHRAL WATERJET ABLATION OF PROSTATE;  Surgeon: Alex Plata MD;  Location: [facility];  Service: Urology;  Laterality: N/A;    Urolift       Social History[1]     Review of Systems:    As noted in HPI in addition      REVIEW OF SYSTEMS  Review of Systems   Constitutional: Negative for decreased appetite, diaphoresis, night sweats, weight gain and weight loss.   HENT:  Negative for nosebleeds and odynophagia.    Eyes:  Negative for double vision and photophobia.   Cardiovascular:  Negative for chest pain, claudication, cyanosis, dyspnea on exertion, irregular heartbeat, leg swelling, near-syncope, orthopnea, palpitations, paroxysmal nocturnal dyspnea and syncope.   Respiratory:  Negative for cough, hemoptysis, shortness of breath and wheezing.    Hematologic/Lymphatic: Negative for adenopathy.   Skin:  Negative for flushing, skin cancer and suspicious lesions.   Musculoskeletal:  Negative for gout, myalgias and neck pain.   Gastrointestinal:  Negative for abdominal pain, heartburn, hematemesis and hematochezia.   Genitourinary:  Negative for bladder  incontinence, hesitancy and nocturia.   Neurological:  Negative for focal weakness, headaches, light-headedness and paresthesias.   Psychiatric/Behavioral:  Negative for memory loss and substance abuse.               Objective:        Vitals:    05/01/25 1052   BP: 130/62   Pulse: (!) 54       Lab Results   Component Value Date    CHOL 142 11/25/2024    TRIG 60 11/25/2024    HDL 42 11/25/2024    ALT 30 11/25/2024    AST 27 11/25/2024     11/25/2024    K 4.6 11/25/2024     11/25/2024    CREATININE 1.0 11/25/2024    BUN 15 11/25/2024    CO2 24 11/25/2024    PSA 1.9 12/10/2024    HGBA1C 7.0 (H) 04/09/2025        ECHOCARDIOGRAM RESULTS  Results for orders placed during the hospital encounter of 04/24/25    Echo    Interpretation Summary    Left Ventricle: The left ventricle is normal in size. Normal wall thickness. There is normal systolic function with a visually estimated ejection fraction of 60 - 65%. There is normal diastolic function.    Right Ventricle: The right ventricle is normal in size. Wall thickness is normal. Systolic function is normal.    Left Atrium: Moderately dilated    Pulmonary Artery: No pulmonary hypertension. The estimated pulmonary artery systolic pressure is 22 mmHg.    IVC/SVC: Normal venous pressure at 3 mmHg.    No results found for this or any previous visit.          CURRENT/PREVIOUS VISIT EKG  Results for orders placed or performed in visit on 04/10/25   IN OFFICE EKG 12-LEAD (to [place])    Collection Time: 04/10/25  1:47 PM   Result Value Ref Range    QRS Duration 84 ms    OHS QTC Calculation 450 ms    Narrative    Test Reason : E10.649,I48.0,    Vent. Rate :  69 BPM     Atrial Rate :  69 BPM     P-R Int : 138 ms          QRS Dur :  84 ms      QT Int : 420 ms       P-R-T Axes :  59  57  15 degrees    QTcB Int : 450 ms    Normal sinus rhythm  Normal ECG  When compared with ECG of 04-Sep-2024 13:43,  No significant change was found  Confirmed by Leonidas Torrez ([id number]) on 4/11/2025  8:13:33 AM    Referred By:            Confirmed By: Leonidas Torrez     No valid procedures specified.   Results for orders placed during the hospital encounter of 04/24/25    Nuclear Stress - Cardiology Interpreted    Interpretation Summary    Abnormal myocardial perfusion scan.    There is a moderate to severe intensity, medium sized, reversible perfusion abnormality that is consistent with ischemia in the basal to apical inferior wall(s).    There are no other significant perfusion abnormalities.    The gated perfusion images showed an ejection fraction of 68% post stress.    There is normal wall motion at post-stress.    LV cavity size is normal at rest and normal at post-stress.    The ECG portion of the study is positive for ischemia.    The patient reported no chest pain during the stress test.    The exercise capacity was normal.    No valid procedures specified.    PHYSICAL EXAM  GENERAL: well built, well nourished, well-developed in no apparent distress alert and oriented.   HEENT: Normocephalic. Pupils normal and conjunctivae normal.  Mucous membranes normal, no cyanosis or icterus, trachea central,no pallor or icterus is noted..   NECK: No JVD. No bruit..   THYROID: Thyroid not enlarged. No nodules present..   CARDIAC:  Normal S1-S2.  No murmur rub click or gallop.  PMI nondisplaced.    LUNGS: Clear to auscultation. No wheezing or rhonchi..   ABDOMEN: Soft no masses or organomegaly.  No abdomen pulsations or bruits.  Normal bowel sounds. No pulsations and no masses felt, No guarding or rebound.   URINARY: No sarabia catheter   EXTREMITIES: No cyanosis, clubbing or edema noted at this time., no calf tenderness bilaterally.   PERIPHERAL VASCULAR SYSTEM: Good palpable distal pulses.  2+ femoral, popliteal and pedal pulses.  No bruits    CENTRAL NERVOUS SYSTEM: No focal motor or sensory deficits noted.   SKIN: Skin without lesions, moist, well perfused.   MUSCLE STRENGTH & TONE: No noteable weakness, atrophy or  abnormal movement    I HAVE REVIEWED :    The vital signs, nurses notes, and all the pertinent radiology and labs.         Current Outpatient Medications   Medication Instructions    atorvastatin (LIPITOR) 10 mg    blood-glucose sensor (DEXCOM G7 SENSOR) Kanwal Change every 10 days    flecainide (TAMBOCOR) 150 MG Tab PRN    insulin degludec (TRESIBA FLEXTOUCH U-100) 100 unit/mL (3 mL) insulin pen 15 units daily - use only if pump malfunctions    insulin lispro (HUMALOG KWIKPEN INSULIN) 100 unit/mL pen Uses sliding scale    insulin  cart,aut,G6/7,cntr (OMNIPOD 5 G6-G7 INTRO KT,GEN5,) Crtg Dispense 1 controller    insulin pump cart,auto,BT,G6/7 (OMNIPOD 5 G6-G7 PODS, GEN 5,) Crtg Changes pods q3days          Assessment:   Abnormal stress nuclear study in the face of longstanding diabetes mellitus dyslipidemia.  Normal echo    Positive family history    SVT nonsustained, uses flecainide as pill in the pocket p.r.n..    Acute on chronic atypical chest pain    Plan:   History of left heart catheterization about 5 years ago normal coronaries.  Suggest follow-up regadenoson PET scan.  Call results          No follow-ups on file.            [1]   Social History  Tobacco Use    Smoking status: Never    Smokeless tobacco: Never   Substance Use Topics    Alcohol use: Yes     Comment: socially    Drug use: Never

## 2025-05-05 ENCOUNTER — PATIENT MESSAGE (OUTPATIENT)
Dept: CARDIOLOGY | Facility: CLINIC | Age: 70
End: 2025-05-05
Payer: MEDICARE

## 2025-05-05 ENCOUNTER — HOSPITAL ENCOUNTER (OUTPATIENT)
Dept: CARDIOLOGY | Facility: HOSPITAL | Age: 70
Discharge: HOME | End: 2025-05-05
Attending: INTERNAL MEDICINE
Payer: MEDICARE

## 2025-05-05 VITALS
WEIGHT: 187.81 LBS | BODY MASS INDEX: 26.29 KG/M2 | HEIGHT: 71 IN | DIASTOLIC BLOOD PRESSURE: 40 MMHG | SYSTOLIC BLOOD PRESSURE: 152 MMHG | HEART RATE: 59 BPM

## 2025-05-05 DIAGNOSIS — N13.8 BPH WITH URINARY OBSTRUCTION: ICD-10-CM

## 2025-05-05 DIAGNOSIS — R07.2 PRECORDIAL PAIN: ICD-10-CM

## 2025-05-05 DIAGNOSIS — N40.1 BPH WITH URINARY OBSTRUCTION: ICD-10-CM

## 2025-05-05 LAB
CFR FLOW - ANTERIOR: 2.88
CFR FLOW - INFERIOR: 2.69
CFR FLOW - LATERAL: 2.77
CFR FLOW - MAX: 3.43
CFR FLOW - MIN: 2.44
CFR FLOW - SEPTAL: 2.76
CFR FLOW - WHOLE HEART: 2.78
CV STRESS BASE HR: 56 BPM
DIASTOLIC BLOOD PRESSURE: 83 MMHG
EJECTION FRACTION- HIGH: 59 %
END DIASTOLIC INDEX-HIGH: 155 ML/M2
END DIASTOLIC INDEX-LOW: 91 ML/M2
END SYSTOLIC INDEX-HIGH: 78 ML/M2
END SYSTOLIC INDEX-LOW: 40 ML/M2
NUC REST DIASTOLIC VOLUME INDEX: 81
NUC REST EJECTION FRACTION: 68
NUC REST SYSTOLIC VOLUME INDEX: 26
NUC STRESS DIASTOLIC VOLUME INDEX: 93
NUC STRESS EJECTION FRACTION: 70 %
NUC STRESS SYSTOLIC VOLUME INDEX: 28
OHS CV CPX 1 MINUTE RECOVERY HEART RATE: 59 BPM
OHS CV CPX 85 PERCENT MAX PREDICTED HEART RATE MALE: 129
OHS CV CPX MAX PREDICTED HEART RATE: 152
OHS CV CPX PATIENT IS FEMALE: 0
OHS CV CPX PATIENT IS MALE: 1
OHS CV CPX PEAK DIASTOLIC BLOOD PRESSURE: 44 MMHG
OHS CV CPX PEAK HEAR RATE: 51 BPM
OHS CV CPX PEAK RATE PRESSURE PRODUCT: 7089
OHS CV CPX PEAK SYSTOLIC BLOOD PRESSURE: 139 MMHG
OHS CV CPX PERCENT MAX PREDICTED HEART RATE ACHIEVED: 34
OHS CV CPX RATE PRESSURE PRODUCT PRESENTING: 8288
OHS CV INITIAL DOSE: 25.8 MCG/KG/MIN
OHS CV MODERATELY REDUCED FLOW CAPACITY: 0 %
OHS CV MYOCARDIAL STEAL: 0 %
OHS CV NO ISCHEMIA MILDLY REDUCED FLOW CAPACTY: 0 %
OHS CV NO ISCHEMIA MINIMALLY REDUCED FLOW CAPACITY: 73 %
OHS CV NON-TRANSMURAL MYOCARDIAL INFARCTION SINGLE CONTINUOUS REGION: 0 %
OHS CV NORMAL FLOW CAPACITY COMPARABLE TO HEALTHY YOUNG VOLUNTEERS: 27 %
OHS CV PEAK DOSE: 25.8 MCG/KG/MIN
OHS CV PET ID: 8748
OHS CV PRE-DOMINANTLY MYOCARDIAL SCAR: 0 %
OHS CV SEVERELY REDUCED FLOW CAPACITY LARGEST SINGLE CONTINUOUS REGION: 0 %
OHS CV SEVERELY REDUCED FLOW CAPACITY: 0 %
OHS CV TOTAL EXAM DLP: 350.6 MGY-CM
REST FLOW - ANTERIOR: 0.54 CC/MIN/G
REST FLOW - INFERIOR: 0.43 CC/MIN/G
REST FLOW - LATERAL: 0.47 CC/MIN/G
REST FLOW - MAX: 0.66 CC/MIN/G
REST FLOW - MIN: 0.36 CC/MIN/G
REST FLOW - SEPTAL: 0.44 CC/MIN/G
REST FLOW - WHOLE HEART: 0.47 CC/MIN/G
RETIRED EF AND QEF - SEE NOTES: 47 %
STRESS FLOW - ANTERIOR: 1.54 CC/MIN/G
STRESS FLOW - INFERIOR: 1.16 CC/MIN/G
STRESS FLOW - LATERAL: 1.31 CC/MIN/G
STRESS FLOW - MAX: 1.89 CC/MIN/G
STRESS FLOW - MIN: 0.91 CC/MIN/G
STRESS FLOW - SEPTAL: 1.22 CC/MIN/G
STRESS FLOW - WHOLE HEART: 1.31 CC/MIN/G
SYSTOLIC BLOOD PRESSURE: 148 MMHG

## 2025-05-05 PROCEDURE — 78431 MYOCRD IMG PET RST&STRS CT: CPT | Mod: 26,,, | Performed by: INTERNAL MEDICINE

## 2025-05-05 PROCEDURE — 93016 CV STRESS TEST SUPVJ ONLY: CPT | Mod: ,,, | Performed by: INTERNAL MEDICINE

## 2025-05-05 PROCEDURE — A9555 RB82 RUBIDIUM: HCPCS | Performed by: INTERNAL MEDICINE

## 2025-05-05 PROCEDURE — 93018 CV STRESS TEST I&R ONLY: CPT | Mod: ,,, | Performed by: INTERNAL MEDICINE

## 2025-05-05 PROCEDURE — 63600175 PHARM REV CODE 636 W HCPCS: Performed by: INTERNAL MEDICINE

## 2025-05-05 PROCEDURE — 78434 AQMBF PET REST & RX STRESS: CPT | Mod: 26,,, | Performed by: INTERNAL MEDICINE

## 2025-05-05 PROCEDURE — 93017 CV STRESS TEST TRACING ONLY: CPT

## 2025-05-05 RX ORDER — AMINOPHYLLINE 25 MG/ML
75 INJECTION, SOLUTION INTRAVENOUS ONCE
Status: COMPLETED | OUTPATIENT
Start: 2025-05-05 | End: 2025-05-05

## 2025-05-05 RX ORDER — REGADENOSON 0.08 MG/ML
0.4 INJECTION, SOLUTION INTRAVENOUS ONCE
Status: COMPLETED | OUTPATIENT
Start: 2025-05-05 | End: 2025-05-05

## 2025-05-05 RX ADMIN — RUBIDIUM CHLORIDE RB-82 25.8 MILLICURIE: 150 INJECTION, SOLUTION INTRAVENOUS at 07:05

## 2025-05-05 RX ADMIN — REGADENOSON 0.4 MG: 0.08 INJECTION, SOLUTION INTRAVENOUS at 07:05

## 2025-05-05 RX ADMIN — AMINOPHYLLINE 75 MG: 25 INJECTION, SOLUTION INTRAVENOUS at 07:05

## 2025-05-15 ENCOUNTER — PATIENT MESSAGE (OUTPATIENT)
Dept: DIABETES | Facility: CLINIC | Age: 70
End: 2025-05-15
Payer: MEDICARE

## 2025-05-15 ENCOUNTER — TELEPHONE (OUTPATIENT)
Dept: ENDOCRINOLOGY | Facility: CLINIC | Age: 70
End: 2025-05-15
Payer: MEDICARE

## 2025-05-15 ENCOUNTER — PATIENT MESSAGE (OUTPATIENT)
Dept: ENDOCRINOLOGY | Facility: CLINIC | Age: 70
End: 2025-05-15
Payer: MEDICARE

## 2025-05-15 DIAGNOSIS — E10.649 TYPE 1 DIABETES MELLITUS WITH HYPOGLYCEMIA AND WITHOUT COMA: ICD-10-CM

## 2025-05-15 RX ORDER — BLOOD-GLUCOSE SENSOR
EACH MISCELLANEOUS
Qty: 9 EACH | Refills: 4 | Status: SHIPPED | OUTPATIENT
Start: 2025-05-15

## 2025-05-15 NOTE — TELEPHONE ENCOUNTER
----- Message from Patric sent at 5/15/2025 11:31 AM CDT -----  Type:  RX Refill RequestWho Called: PT Refill or New Rx: REFILL RX Name and Strength:blood-glucose sensor (DEXCOM G7 SENSOR) DeviHow is the patient currently taking it? (ex. 1XDay):as directedIs this a 30 day or 90 day RX: 90 Preferred Pharmacy with phone number:[facility] [place] [address] [address] [phone] or Mail Order:LOCAL Ordering Provider:BELLE Kincaid the patient rather a call back or a response via [facility]? Call back Best Call Back Number:[phone] Information: All Pharmacies are out of this device and [facility] is the only one that has some, is holding it for him needs this called in asap please   Please call back to advise. Thanks!

## 2025-05-15 NOTE — TELEPHONE ENCOUNTER
S/w pt and notified him Rx for Dexcom G7 sensor called into the [facility] in [place] in [place].pt v/u

## 2025-05-16 ENCOUNTER — PATIENT MESSAGE (OUTPATIENT)
Dept: CARDIOLOGY | Facility: CLINIC | Age: 70
End: 2025-05-16
Payer: MEDICARE

## 2025-06-18 ENCOUNTER — TELEPHONE (OUTPATIENT)
Dept: FAMILY MEDICINE | Facility: CLINIC | Age: 70
End: 2025-06-18
Payer: MEDICARE

## 2025-06-18 NOTE — TELEPHONE ENCOUNTER
Copied from CRM #[id number]. Topic: Appointments - Appointment Access  >> Jun 18, 2025 12:44 PM Thais wrote:  Type:  Sooner Appointment Request    Caller is requesting a sooner appointment.  Caller declined first available appointment listed below.  Caller will not accept being placed on the waitlist and is requesting a message be sent to doctor.    Name of Caller:  pt   When is the first available appointment?  June 25  Symptoms:  awv  Would the patient rather a call back or a response via [facility]? call  Best Call Back Number:  [phone]    Additional Information:  please advise

## 2025-06-26 ENCOUNTER — OFFICE VISIT (OUTPATIENT)
Dept: CARDIOLOGY | Facility: CLINIC | Age: 70
End: 2025-06-26
Payer: MEDICARE

## 2025-06-26 VITALS
HEART RATE: 52 BPM | HEIGHT: 71 IN | BODY MASS INDEX: 25.83 KG/M2 | SYSTOLIC BLOOD PRESSURE: 134 MMHG | WEIGHT: 184.5 LBS | DIASTOLIC BLOOD PRESSURE: 62 MMHG

## 2025-06-26 DIAGNOSIS — E10.9 TYPE 1 DIABETES MELLITUS WITHOUT COMPLICATION: ICD-10-CM

## 2025-06-26 DIAGNOSIS — N13.8 BPH WITH URINARY OBSTRUCTION: ICD-10-CM

## 2025-06-26 DIAGNOSIS — R07.2 PRECORDIAL PAIN: ICD-10-CM

## 2025-06-26 DIAGNOSIS — I48.0 PAROXYSMAL ATRIAL FIBRILLATION: Primary | ICD-10-CM

## 2025-06-26 DIAGNOSIS — N40.1 BPH WITH URINARY OBSTRUCTION: ICD-10-CM

## 2025-06-26 PROCEDURE — 3051F HG A1C>EQUAL 7.0%<8.0%: CPT | Mod: CPTII,S$GLB,, | Performed by: INTERNAL MEDICINE

## 2025-06-26 PROCEDURE — 3060F POS MICROALBUMINURIA REV: CPT | Mod: CPTII,S$GLB,, | Performed by: INTERNAL MEDICINE

## 2025-06-26 PROCEDURE — 3288F FALL RISK ASSESSMENT DOCD: CPT | Mod: CPTII,S$GLB,, | Performed by: INTERNAL MEDICINE

## 2025-06-26 PROCEDURE — 3066F NEPHROPATHY DOC TX: CPT | Mod: CPTII,S$GLB,, | Performed by: INTERNAL MEDICINE

## 2025-06-26 PROCEDURE — 3078F DIAST BP <80 MM HG: CPT | Mod: CPTII,S$GLB,, | Performed by: INTERNAL MEDICINE

## 2025-06-26 PROCEDURE — 1126F AMNT PAIN NOTED NONE PRSNT: CPT | Mod: CPTII,S$GLB,, | Performed by: INTERNAL MEDICINE

## 2025-06-26 PROCEDURE — 99999 PR PBB SHADOW E&M-EST. PATIENT-LVL III: CPT | Mod: PBBFAC,,, | Performed by: INTERNAL MEDICINE

## 2025-06-26 PROCEDURE — 99214 OFFICE O/P EST MOD 30 MIN: CPT | Mod: S$GLB,,, | Performed by: INTERNAL MEDICINE

## 2025-06-26 PROCEDURE — 1101F PT FALLS ASSESS-DOCD LE1/YR: CPT | Mod: CPTII,S$GLB,, | Performed by: INTERNAL MEDICINE

## 2025-06-26 PROCEDURE — 1159F MED LIST DOCD IN RCRD: CPT | Mod: CPTII,S$GLB,, | Performed by: INTERNAL MEDICINE

## 2025-06-26 PROCEDURE — 3075F SYST BP GE 130 - 139MM HG: CPT | Mod: CPTII,S$GLB,, | Performed by: INTERNAL MEDICINE

## 2025-06-26 PROCEDURE — 3008F BODY MASS INDEX DOCD: CPT | Mod: CPTII,S$GLB,, | Performed by: INTERNAL MEDICINE

## 2025-06-26 NOTE — PROGRESS NOTES
Subjective:    Patient ID:  Juventino Amos is a 69 y.o. male patient here for evaluation Follow-up      History of Present Illness:  Cardiology follow-up, test results.  Follow-up regadenoson CT PET normal.  No evidence of coronary calcification.  No ischemia.  Prior nuclear perfusion imaging via SPECT inferior wall defect.    Last lipid panel normal.  Patient on low-dose Lipitor 10 mg.    History of SVT, uses flecainide as pill in the pocket.  Last episode this month.  No increase in the frequency or intensity of the episodes.  No syncope.  Intermittent resting sinus bradycardia.  Asymptomatic.             Review of patient's allergies indicates:  No Known Allergies    Past Medical History:   Diagnosis Date    Arthritis     Diabetes mellitus      Past Surgical History:   Procedure Laterality Date    CHOLECYSTECTOMY      2017    TONSILLECTOMY      TRANSURETHRAL WATERJET ABLATION OF PROSTATE N/A 12/18/2024    Procedure: TRANSURETHRAL WATERJET ABLATION OF PROSTATE;  Surgeon: Alex Plata MD;  Location: [facility];  Service: Urology;  Laterality: N/A;    Urolift       Social History[1]     Review of Systems:    As noted in HPI in addition      REVIEW OF SYSTEMS  Review of Systems   Constitutional: Negative for decreased appetite, diaphoresis, night sweats, weight gain and weight loss.   HENT:  Negative for nosebleeds and odynophagia.    Eyes:  Negative for double vision and photophobia.   Cardiovascular:  Negative for chest pain, claudication, cyanosis, dyspnea on exertion, irregular heartbeat, leg swelling, near-syncope, orthopnea, palpitations, paroxysmal nocturnal dyspnea and syncope.   Respiratory:  Negative for cough, hemoptysis, shortness of breath and wheezing.    Hematologic/Lymphatic: Negative for adenopathy.   Skin:  Negative for flushing, skin cancer and suspicious lesions.   Musculoskeletal:  Negative for gout, myalgias and neck pain.   Gastrointestinal:  Negative for abdominal pain, heartburn, hematemesis and  hematochezia.   Genitourinary:  Negative for bladder incontinence, hesitancy and nocturia.   Neurological:  Negative for focal weakness, headaches, light-headedness and paresthesias.   Psychiatric/Behavioral:  Negative for memory loss and substance abuse.               Objective:        Vitals:    06/26/25 0842   BP: 134/62   Pulse: (!) 52       Lab Results   Component Value Date    CHOL 142 11/25/2024    TRIG 60 11/25/2024    HDL 42 11/25/2024    ALT 30 11/25/2024    AST 27 11/25/2024     11/25/2024    K 4.6 11/25/2024     11/25/2024    CREATININE 1.0 11/25/2024    BUN 15 11/25/2024    CO2 24 11/25/2024    PSA 1.9 12/10/2024    HGBA1C 7.0 (H) 04/09/2025        ECHOCARDIOGRAM RESULTS  Results for orders placed during the hospital encounter of 04/24/25    Echo    Interpretation Summary    Left Ventricle: The left ventricle is normal in size. Normal wall thickness. There is normal systolic function with a visually estimated ejection fraction of 60 - 65%. There is normal diastolic function.    Right Ventricle: The right ventricle is normal in size. Wall thickness is normal. Systolic function is normal.    Left Atrium: Moderately dilated    Pulmonary Artery: No pulmonary hypertension. The estimated pulmonary artery systolic pressure is 22 mmHg.    IVC/SVC: Normal venous pressure at 3 mmHg.    No results found for this or any previous visit.          CURRENT/PREVIOUS VISIT EKG  Results for orders placed or performed in visit on 04/10/25   IN OFFICE EKG 12-LEAD (to [facility])    Collection Time: 04/10/25  1:47 PM   Result Value Ref Range    QRS Duration 84 ms    [facility] QTC Calculation 450 ms    Narrative    Test Reason : E10.649,I48.0,    Vent. Rate :  69 BPM     Atrial Rate :  69 BPM     P-R Int : 138 ms          QRS Dur :  84 ms      QT Int : 420 ms       P-R-T Axes :  59  57  15 degrees    QTcB Int : 450 ms    Normal sinus rhythm  Normal ECG  When compared with ECG of 04-Sep-2024 13:43,  No significant change was  found  Confirmed by Leonidas Torrez ([id number]) on 4/11/2025 8:13:33 AM    Referred By:            Confirmed By: Leonidas Torrez     No valid procedures specified.   Results for orders placed during the hospital encounter of 04/24/25    Nuclear Stress - Cardiology Interpreted    Interpretation Summary    Abnormal myocardial perfusion scan.    There is a moderate to severe intensity, medium sized, reversible perfusion abnormality that is consistent with ischemia in the basal to apical inferior wall(s).    There are no other significant perfusion abnormalities.    The gated perfusion images showed an ejection fraction of 68% post stress.    There is normal wall motion at post-stress.    LV cavity size is normal at rest and normal at post-stress.    The ECG portion of the study is positive for ischemia.    The patient reported no chest pain during the stress test.    The exercise capacity was normal.    No valid procedures specified.    PHYSICAL EXAM  GENERAL: well built, well nourished, well-developed in no apparent distress alert and oriented.   HEENT: Normocephalic. Pupils normal and conjunctivae normal.  Mucous membranes normal, no cyanosis or icterus, trachea central,no pallor or icterus is noted..   NECK: No JVD. No bruit..   THYROID: Thyroid not enlarged. No nodules present..   CARDIAC:  Normal S1-S2.  No murmur rub click or gallop.  PMI nondisplaced.    LUNGS: Clear to auscultation. No wheezing or rhonchi..   ABDOMEN: Soft no masses or organomegaly.  No abdomen pulsations or bruits.  Normal bowel sounds. No pulsations and no masses felt, No guarding or rebound.   URINARY: No sarabia catheter   EXTREMITIES: No cyanosis, clubbing or edema noted at this time., no calf tenderness bilaterally.   PERIPHERAL VASCULAR SYSTEM: Good palpable distal pulses.  2+ femoral, popliteal and pedal pulses.  No bruits    CENTRAL NERVOUS SYSTEM: No focal motor or sensory deficits noted.   SKIN: Skin without lesions, moist, well perfused.    MUSCLE STRENGTH & TONE: No noteable weakness, atrophy or abnormal movement    I HAVE REVIEWED :    The vital signs, nurses notes, and all the pertinent radiology and labs.         Current Outpatient Medications   Medication Instructions    atorvastatin (LIPITOR) 10 mg    blood-glucose sensor (DEXCOM G7 SENSOR) Kanwal Change every 10 days    flecainide (TAMBOCOR) 150 MG Tab PRN    insulin degludec (TRESIBA FLEXTOUCH U-100) 100 unit/mL (3 mL) insulin pen 15 units daily - use only if pump malfunctions    insulin lispro (HUMALOG KWIKPEN INSULIN) 100 unit/mL pen Uses sliding scale    insulin  cart,aut,G6/7,cntr (OMNIPOD 5 G6-G7 INTRO KT,GEN5,) Crtg Dispense 1 controller    insulin pump cart,auto,BT,G6/7 (OMNIPOD 5 G6-G7 PODS, GEN 5,) Crtg Changes pods q3days          Assessment:   SVT, well controlled with bone in the pocket flecainide.  Intermittent sinus bradycardia.  Asymptomatic.    Follow-up regadenoson PET to clarify previous abnormal SPECT study normal.  No coronary calcification noted.    Diabetes mellitus.        Plan:   No coronary calcification and normal baseline lipids study off of statin agent okay to discontinue atorvastatin 10.  Continue follow up labs with primary care.  Aggressive control diabetes mellitus.              No follow-ups on file.            [1]   Social History  Tobacco Use    Smoking status: Never    Smokeless tobacco: Never   Substance Use Topics    Alcohol use: Yes     Comment: socially    Drug use: Never

## 2025-07-16 ENCOUNTER — LAB VISIT (OUTPATIENT)
Dept: LAB | Facility: HOSPITAL | Age: 70
End: 2025-07-16
Attending: NURSE PRACTITIONER
Payer: MEDICARE

## 2025-07-16 DIAGNOSIS — E10.9 TYPE 1 DIABETES MELLITUS WITHOUT COMPLICATION: ICD-10-CM

## 2025-07-16 LAB
ALBUMIN/CREAT UR: 48.2 UG/MG
CREAT UR-MCNC: 191 MG/DL (ref 23–375)
EAG (OHS): 160 MG/DL (ref 68–131)
HBA1C MFR BLD: 7.2 % (ref 4–5.6)
MICROALBUMIN UR-MCNC: 92 UG/ML (ref ?–5000)

## 2025-07-16 PROCEDURE — 82043 UR ALBUMIN QUANTITATIVE: CPT

## 2025-07-16 PROCEDURE — 36415 COLL VENOUS BLD VENIPUNCTURE: CPT | Mod: PO

## 2025-07-16 PROCEDURE — 83036 HEMOGLOBIN GLYCOSYLATED A1C: CPT

## 2025-07-22 ENCOUNTER — TELEPHONE (OUTPATIENT)
Dept: ENDOCRINOLOGY | Facility: CLINIC | Age: 70
End: 2025-07-22
Payer: MEDICARE

## 2025-07-23 ENCOUNTER — OFFICE VISIT (OUTPATIENT)
Dept: ENDOCRINOLOGY | Facility: CLINIC | Age: 70
End: 2025-07-23
Payer: MEDICARE

## 2025-07-23 VITALS
OXYGEN SATURATION: 97 % | DIASTOLIC BLOOD PRESSURE: 70 MMHG | BODY MASS INDEX: 25.6 KG/M2 | WEIGHT: 182.88 LBS | HEIGHT: 71 IN | HEART RATE: 51 BPM | SYSTOLIC BLOOD PRESSURE: 124 MMHG

## 2025-07-23 DIAGNOSIS — Z46.81 INSULIN PUMP FITTING OR ADJUSTMENT: ICD-10-CM

## 2025-07-23 DIAGNOSIS — R80.9 TYPE 1 DIABETES MELLITUS WITH DIABETIC MICROALBUMINURIA: Primary | ICD-10-CM

## 2025-07-23 DIAGNOSIS — E10.29 TYPE 1 DIABETES MELLITUS WITH DIABETIC MICROALBUMINURIA: Primary | ICD-10-CM

## 2025-07-23 RX ORDER — LISINOPRIL 2.5 MG/1
2.5 TABLET ORAL DAILY
Qty: 90 TABLET | Refills: 3 | Status: SHIPPED | OUTPATIENT
Start: 2025-07-23 | End: 2026-07-23

## 2025-07-23 RX ORDER — INSULIN LISPRO 100 [IU]/ML
INJECTION, SOLUTION INTRAVENOUS; SUBCUTANEOUS
Qty: 50 ML | Refills: 3 | Status: SHIPPED | OUTPATIENT
Start: 2025-07-23

## 2025-07-23 NOTE — PROGRESS NOTES
CC: This 69 y.o. male presents for management of diabetes  and chronic conditions pending review including afib, microalbuminuria    HPI: He was diagnosed with T2DM in his mid 30s. Then was told he was ~ 1.5 ~ 3 years ago. Has never been hospitalized r/t DM.  Family hx of DM: father and sisters x 2   Moved from [place] in May of 2024     Since last visit started on Dexcom G7 and Omni pod 5- see downloads in media tab  6% Very High  27% High  66% In Range  1% Low  <1% Very Low  GMI  7.2  %  Having pp excursions, most notably post supper     Diet: Eats 3 Meals a day, snacks-  Chocolate almonds  Exercise: very active- 05237 steps daily, golfing, Pickle ball, bikes, yard word   CURRENT DM MEDS: Humalog in Omni Pod 5  Basal rate  0.55 u/h  ISF 45  Carb ratio  7  Target 120  Active Ins 3 hrs  Timing prandial insulin 5-15 minutes before meals: yes  Vial/pen:  Uses pens     Standards of Care:  Eye exam: 12/2024 -  Dr Valdez      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   MS/Neuro: Denies numbness/ tingling in BLE; Gait steady, speech clear  Other systems: negative.    PE:  GENERAL: Well developed, well nourished.  PSYCH: AAOx3, appropriate mood and affect, pleasant expression, conversant, appears relaxed, well groomed.   EYES: Conjunctiva, corneas clear  NECK: Supple, trachea midline  NEURO: Gait steady  SKIN:   no acanthosis nigracans.  FOOT EXAMINATION: 7/23/2025  No foot deformity, corns or callus formation,  nails in good condition and well trimmed, no interspace maceration or ulceration noted.  Decreased hair growth present over toes/feet.   Protective sensation intact with 10 gram monofilament.  +2 dorsalis pedis and posterior pulses noted.     Personally reviewed Past  "Medical, Surgical, Social History.    /70 (BP Location: Left arm, Patient Position: Sitting)   Pulse (!) 51   Ht 5' 11" (1.803 m)   Wt 83 kg (182 lb 14 oz)   SpO2 97%   BMI 25.51 kg/m²      Personally reviewed the below labs:      Chemistry        Component Value Date/Time     11/25/2024 0902    K 4.6 11/25/2024 0902     11/25/2024 0902    CO2 24 11/25/2024 0902    BUN 15 11/25/2024 0902    CREATININE 1.0 11/25/2024 0902     (H) 11/25/2024 0902        Component Value Date/Time    CALCIUM 9.1 11/25/2024 0902    ALKPHOS 72 11/25/2024 0902    AST 27 11/25/2024 0902    ALT 30 11/25/2024 0902    BILITOT 0.6 11/25/2024 0902            No results found for: "TSH"    Recent Labs   Lab 11/25/24  0902   LDL Cholesterol 88.0   HDL 42   Cholesterol 142        No results found for this or any previous visit.  No results found for this or any previous visit.    Lab Results   Component Value Date    MICALBCREAT 48.2 (H) 07/16/2025       Hemoglobin A1C   Date Value Ref Range Status   11/25/2024 7.9 (H) 4.0 - 5.6 % Final     Comment:     ADA Screening Guidelines:  5.7-6.4%  Consistent with prediabetes  >or=6.5%  Consistent with diabetes    High levels of fetal hemoglobin interfere with the HbA1C  assay. Heterozygous hemoglobin variants (HbS, HgC, etc)do  not significantly interfere with this assay.   However, presence of multiple variants may affect accuracy.     11/25/2024 7.9 (H) 4.0 - 5.6 % Final     Comment:     ADA Screening Guidelines:  5.7-6.4%  Consistent with prediabetes  >or=6.5%  Consistent with diabetes    High levels of fetal hemoglobin interfere with the HbA1C  assay. Heterozygous hemoglobin variants (HbS, HgC, etc)do  not significantly interfere with this assay.   However, presence of multiple variants may affect accuracy.       Hemoglobin A1c   Date Value Ref Range Status   07/16/2025 7.2 (H) 4.0 - 5.6 % Final     Comment:     ADA Screening Guidelines:  5.7-6.4%  Consistent with " prediabetes  >=6.5%  Consistent with diabetes    High levels of fetal hemoglobin interfere with the HbA1C  assay. Heterozygous hemoglobin variants (HbS, HgC, etc)do  not significantly interfere with this assay.   However, presence of multiple variants may affect accuracy.   04/09/2025 7.0 (H) 4.0 - 5.6 % Final     Comment:     ADA Screening Guidelines:  5.7-6.4%  Consistent with prediabetes  >=6.5%  Consistent with diabetes    High levels of fetal hemoglobin interfere with the HbA1C  assay. Heterozygous hemoglobin variants (HbS, HgC, etc)do  not significantly interfere with this assay.   However, presence of multiple variants may affect accuracy.        ASSESSMENT and PLAN:      1. T1DM with hyperglycemia, microalbuminuria  Start Ace I for kidney protection  Continue Dexcom G7 and Omni Pod 5  Pump changes:   Basal rate  0.55 u/h  ISF 45  Carb ratio    MN   7  1600 6  Target 110  Active Ins 3 hrs      2. Afib- following w Dr Lopez, Continue to optimize bg, recommend resume statin as this is a standard of care    3.Insulin pump adjustment as above as above         Follow-up: in 3 months with A1C , cmp, lipid, tsh

## 2025-08-04 ENCOUNTER — OFFICE VISIT (OUTPATIENT)
Dept: DERMATOLOGY | Facility: CLINIC | Age: 70
End: 2025-08-04
Payer: MEDICARE

## 2025-08-04 VITALS — BODY MASS INDEX: 25.06 KG/M2 | WEIGHT: 179 LBS | HEIGHT: 71 IN

## 2025-08-04 DIAGNOSIS — L82.1 SEBORRHEIC KERATOSES: ICD-10-CM

## 2025-08-04 DIAGNOSIS — L57.0 ACTINIC KERATOSES: ICD-10-CM

## 2025-08-04 DIAGNOSIS — L57.8 ACTINIC SKIN DAMAGE: ICD-10-CM

## 2025-08-04 DIAGNOSIS — Z12.83 SKIN CANCER SCREENING: ICD-10-CM

## 2025-08-04 DIAGNOSIS — D48.5 NEOPLASM OF UNCERTAIN BEHAVIOR OF SKIN: Primary | ICD-10-CM

## 2025-08-04 DIAGNOSIS — D22.9 MULTIPLE BENIGN NEVI: ICD-10-CM

## 2025-08-04 PROCEDURE — 88341 IMHCHEM/IMCYTCHM EA ADD ANTB: CPT | Mod: TC | Performed by: DERMATOLOGY

## 2025-08-04 NOTE — PROGRESS NOTES
Subjective:      Patient ID:  Juventino Amos is a 69 y.o. male who presents for   Chief Complaint   Patient presents with    Skin Check     tbsc    Spot     scrotum     New patient    Here today for a TBSC  C/o spot scrotum that gets irritated  at times x several years    Derm hx:  Has no hx of NMSC  Has no fhx of MM    Current Outpatient Medications:   ·  blood-glucose sensor (DEXCOM G7 SENSOR) Kanwal, Change every 10 days, Disp: 9 each, Rfl: 4  ·  flecainide (TAMBOCOR) 150 MG Tab, PRN, Disp: , Rfl:   ·  insulin lispro (HUMALOG U-100 INSULIN) 100 unit/mL injection, Uses 50u/day in insulin pump, Disp: 50 mL, Rfl: 3  ·  insulin  cart,aut,G6/7,cntr (OMNIPOD 5 G6-G7 INTRO KT,GEN5,) Crtg, Dispense 1 controller, Disp: 1 each, Rfl: 0  ·  insulin pump cart,auto,BT,G6/7 (OMNIPOD 5 G6-G7 PODS, GEN 5,) Crtg, Changes pods q3days, Disp: 30 each, Rfl: 3  ·  lisinopriL (PRINIVIL,ZESTRIL) 2.5 MG tablet, Take 1 tablet (2.5 mg total) by mouth once daily., Disp: 90 tablet, Rfl: 3  ·  atorvastatin (LIPITOR) 10 MG tablet, 10 mg., Disp: , Rfl:   ·  insulin degludec (TRESIBA FLEXTOUCH U-100) 100 unit/mL (3 mL) insulin pen, 15 units daily - use only if pump malfunctions (Patient not taking: Reported on 7/23/2025), Disp: , Rfl:   ·  insulin lispro (HUMALOG KWIKPEN INSULIN) 100 unit/mL pen, Uses sliding scale, Disp: , Rfl:         Review of Systems   Constitutional:  Negative for fever and chills.   Skin:  Positive for wears hat. Negative for itching, rash, dry skin, daily sunscreen use and activity-related sunscreen use.   Hematologic/Lymphatic: Bruises/bleeds easily.       Objective:   Physical Exam   Constitutional: He appears well-developed and well-nourished. No distress.   Neurological: He is alert and oriented to person, place, and time. He is not disoriented.   Psychiatric: He has a normal mood and affect.   Skin:   Areas Examined (abnormalities noted in diagram):   Scalp / Hair Palpated and Inspected  Head / Face Inspection  Performed  Neck Inspection Performed  Chest / Axilla Inspection Performed  Abdomen Inspection Performed  Genitals / Buttocks / Groin Inspection Performed  Back Inspection Performed  RUE Inspected  LUE Inspection Performed  RLE Inspected  LLE Inspection Performed  Nails and Digits Inspection Performed                 Diagram Legend     Erythematous scaling macule/papule c/w actinic keratosis       Vascular papule c/w angioma      Pigmented verrucoid papule/plaque c/w seborrheic keratosis      Yellow umbilicated papule c/w sebaceous hyperplasia      Irregularly shaped tan macule c/w lentigo     1-2 mm smooth white papules consistent with Milia      Movable subcutaneous cyst with punctum c/w epidermal inclusion cyst      Subcutaneous movable cyst c/w pilar cyst      Firm pink to brown papule c/w dermatofibroma      Pedunculated fleshy papule(s) c/w skin tag(s)      Evenly pigmented macule c/w junctional nevus     Mildly variegated pigmented, slightly irregular-bordered macule c/w mildly atypical nevus      Flesh colored to evenly pigmented papule c/w intradermal nevus       Pink pearly papule/plaque c/w basal cell carcinoma      Erythematous hyperkeratotic cursted plaque c/w SCC      Surgical scar with no sign of skin cancer recurrence      Open and closed comedones      Inflammatory papules and pustules      Verrucoid papule consistent consistent with wart     Erythematous eczematous patches and plaques     Dystrophic onycholytic nail with subungual debris c/w onychomycosis     Umbilicated papule    Erythematous-base heme-crusted tan verrucoid plaque consistent with inflamed seborrheic keratosis     Erythematous Silvery Scaling Plaque c/w Psoriasis     See annotation      Assessment / Plan:      Pathology Orders:       Normal Orders This Visit    Specimen to Pathology, Dermatology     Questions:    Procedure Type: Dermatology and skin neoplasms    Number of Specimens: 1    ------------------------:  -------------------------    Spec 1 Procedure: Excision    Spec 1 Clinical Impression: foreign body vs cyst vs other    Spec 1 Source: scrotum    Clinical Information: see EPIC    Clinical History: see EPIC    Specimen Source: Skin    Release to patient: Immediate    Send normal result to authorizing provider's In Basket if patient is active on MyChart: Yes          Neoplasm of uncertain behavior of skin  -     Specimen to Pathology, Dermatology  PREPARATION: The diagnosis, procedure, alternatives, benefits and risks, including but not limited to: infection, bleeding/bruising, drug reactions, pain, scar or cosmetic defect, local sensation disturbances, wound dehiscence (separation of wound edges after sutures removed) and/or recurrence of present condition were explained to the patient. The patient elected to proceed.  Patient's identity was verified using 2 patient identifiers and the side and site was verified.  Time out period with surgeon, assistant and patient in surgical suite was taken.    PROCEDURE: The location noted above was prepped and draped in the usual sterile fashion. The area was anesthetized with intradermal buffered xylocaine. An elliptiform excision with a #15 scalpel was performed to access the  cyst wall, and dissection was carried out around the cyst wall.  Electrocoagulation was used to obtain hemostasis. Blood loss was minimal. The wound was thenallowed to heal by secondary intention  Lesion size: 0.6 cm  Final incision length: 0.6 cm     Skin cancer screening  -     Ambulatory referral/consult to Dermatology  Area of previous melanoma examined. Site well healed with no signs of recurrence.    Total body skin examination performed today including at least 12 points as noted in physical examination. No lesions suspicious for malignancy noted.    Actinic keratoses  Cryosurgery Procedure Note    Verbal consent from the patient is obtained and the patient is aware of the precancerous quality and  need for treatment of these lesions. Liquid nitrogen cryosurgery is applied to the 2 actinic keratoses, as detailed in the physical exam, to produce a freeze injury. The patient is aware that blisters may form and is instructed on wound care with gentle cleansing and use of vaseline ointment to keep moist until healed. The patient is supplied a handout on cryosurgery and is instructed to call if lesions do not completely resolve.    Seborrheic keratoses  These are benign inherited growths without a malignant potential. Reassurance given to patient. No treatment is necessary.     Multiple benign nevi  Careful dermoscopy evaluation of nevi performed with none identified as needing biopsy today  Monitor for new mole or moles that are becoming bigger, darker, irritated, or developing irregular borders.     Actinic skin damage  Patient instructed in importance in daily broad spectrum sun protection of at least spf 30. Mineral sunscreen ingredients preferred (Zinc +/- Titanium) and can be found OTC.   Patient encouraged to wear hat for all outdoor exposure.   Also discussed sun avoidance and use of protective clothing.             Follow up if symptoms worsen or fail to improve.

## 2025-08-04 NOTE — PATIENT INSTRUCTIONS

## 2025-08-07 LAB
ESTROGEN SERPL-MCNC: NORMAL PG/ML
INSULIN SERPL-ACNC: NORMAL U[IU]/ML
LAB AP CLINICAL INFORMATION: NORMAL
LAB AP GROSS DESCRIPTION: NORMAL
LAB AP PERFORMING LOCATION(S): NORMAL
LAB AP REPORT FOOTNOTES: NORMAL
T3RU NFR SERPL: NORMAL %

## 2025-08-19 ENCOUNTER — PATIENT MESSAGE (OUTPATIENT)
Dept: ADMINISTRATIVE | Facility: HOSPITAL | Age: 70
End: 2025-08-19
Payer: MEDICARE